# Patient Record
Sex: FEMALE | Race: WHITE | NOT HISPANIC OR LATINO
[De-identification: names, ages, dates, MRNs, and addresses within clinical notes are randomized per-mention and may not be internally consistent; named-entity substitution may affect disease eponyms.]

---

## 2017-02-24 ENCOUNTER — RECORD ABSTRACTING (OUTPATIENT)
Age: 32
End: 2017-02-24

## 2017-02-28 ENCOUNTER — RECORD ABSTRACTING (OUTPATIENT)
Age: 32
End: 2017-02-28

## 2017-02-28 RX ORDER — TOPIRAMATE 50 MG/1
TABLET, COATED ORAL
Refills: 0 | Status: ACTIVE | COMMUNITY

## 2017-06-30 ENCOUNTER — OUTPATIENT (OUTPATIENT)
Dept: OUTPATIENT SERVICES | Facility: HOSPITAL | Age: 32
LOS: 1 days | Discharge: HOME | End: 2017-06-30

## 2017-06-30 DIAGNOSIS — R53.83 OTHER FATIGUE: ICD-10-CM

## 2017-06-30 DIAGNOSIS — N20.0 CALCULUS OF KIDNEY: ICD-10-CM

## 2017-06-30 DIAGNOSIS — R06.02 SHORTNESS OF BREATH: ICD-10-CM

## 2017-06-30 DIAGNOSIS — R53.81 OTHER MALAISE: ICD-10-CM

## 2017-06-30 DIAGNOSIS — E78.00 PURE HYPERCHOLESTEROLEMIA, UNSPECIFIED: ICD-10-CM

## 2017-09-08 ENCOUNTER — APPOINTMENT (OUTPATIENT)
Dept: HEMATOLOGY ONCOLOGY | Facility: CLINIC | Age: 32
End: 2017-09-08

## 2017-09-08 VITALS
TEMPERATURE: 98.1 F | SYSTOLIC BLOOD PRESSURE: 109 MMHG | DIASTOLIC BLOOD PRESSURE: 50 MMHG | RESPIRATION RATE: 14 BRPM | HEART RATE: 57 BPM | HEIGHT: 63 IN | BODY MASS INDEX: 23.92 KG/M2 | WEIGHT: 135 LBS

## 2017-09-08 DIAGNOSIS — G43.909 MIGRAINE, UNSPECIFIED, NOT INTRACTABLE, W/OUT STATUS MIGRAINOSUS: ICD-10-CM

## 2017-09-08 DIAGNOSIS — N20.0 CALCULUS OF KIDNEY: ICD-10-CM

## 2017-09-08 DIAGNOSIS — Z80.0 FAMILY HISTORY OF MALIGNANT NEOPLASM OF DIGESTIVE ORGANS: ICD-10-CM

## 2017-09-08 DIAGNOSIS — Z82.3 FAMILY HISTORY OF STROKE: ICD-10-CM

## 2017-09-08 DIAGNOSIS — Q64.10 EXSTROPHY OF URINARY BLADDER, UNSPECIFIED: ICD-10-CM

## 2017-09-11 LAB
BASOPHILS # BLD: 0.03 TH/MM3
BASOPHILS NFR BLD: 0.8 %
EOSINOPHIL # BLD: 0.07 TH/MM3
EOSINOPHIL NFR BLD: 1.8 %
ERYTHROCYTE [DISTWIDTH] IN BLOOD BY AUTOMATED COUNT: 18.8 %
FERRITIN SERPL-MCNC: 8 NG/ML
FOLATE SERPL-MCNC: 8.3 NG/ML
GRANULOCYTES # BLD: 1.9 TH/MM3
GRANULOCYTES NFR BLD: 50 %
HCT VFR BLD AUTO: 30.3 %
HGB BLD-MCNC: 9.3 G/DL
IMM GRANULOCYTES # BLD: 0 TH/MM3
IMM GRANULOCYTES NFR BLD: 0 %
LYMPHOCYTES # BLD: 1.53 TH/MM3
LYMPHOCYTES NFR BLD: 40.3 %
MCH RBC QN AUTO: 22.8 PG
MCHC RBC AUTO-ENTMCNC: 30.7 G/DL
MCV RBC AUTO: 74.3 FL
MONOCYTES # BLD: 0.27 TH/MM3
MONOCYTES NFR BLD: 7.1 %
PERCENT SATURATION (NORTH): 3.1 %
PLATELET # BLD: 214 TH/MM3
PMV BLD AUTO: 10.4 FL
RBC # BLD AUTO: 4.08 MIL/MM3
TIBC SERPL-MCNC: 459 UG/DL
VIT B12 SERPL-MCNC: 570 PG/ML
WBC # BLD: 3.8 TH/MM3

## 2017-09-12 ENCOUNTER — APPOINTMENT (OUTPATIENT)
Dept: INFUSION THERAPY | Facility: CLINIC | Age: 32
End: 2017-09-12

## 2017-09-13 LAB — METHYLMALONATE SERPL-SCNC: 124 NMOL/L

## 2017-09-19 ENCOUNTER — APPOINTMENT (OUTPATIENT)
Dept: INFUSION THERAPY | Facility: CLINIC | Age: 32
End: 2017-09-19

## 2017-09-19 VITALS
TEMPERATURE: 97.7 F | SYSTOLIC BLOOD PRESSURE: 100 MMHG | HEART RATE: 65 BPM | DIASTOLIC BLOOD PRESSURE: 69 MMHG | RESPIRATION RATE: 18 BRPM

## 2017-10-11 ENCOUNTER — APPOINTMENT (OUTPATIENT)
Dept: HEMATOLOGY ONCOLOGY | Facility: CLINIC | Age: 32
End: 2017-10-11

## 2017-10-13 ENCOUNTER — OUTPATIENT (OUTPATIENT)
Dept: OUTPATIENT SERVICES | Facility: HOSPITAL | Age: 32
LOS: 1 days | Discharge: HOME | End: 2017-10-13

## 2017-10-13 DIAGNOSIS — D50.9 IRON DEFICIENCY ANEMIA, UNSPECIFIED: ICD-10-CM

## 2017-10-18 ENCOUNTER — APPOINTMENT (OUTPATIENT)
Dept: HEMATOLOGY ONCOLOGY | Facility: CLINIC | Age: 32
End: 2017-10-18

## 2017-10-18 VITALS
BODY MASS INDEX: 23.92 KG/M2 | SYSTOLIC BLOOD PRESSURE: 102 MMHG | DIASTOLIC BLOOD PRESSURE: 55 MMHG | HEIGHT: 63 IN | TEMPERATURE: 98.4 F | WEIGHT: 135 LBS | HEART RATE: 56 BPM | RESPIRATION RATE: 16 BRPM

## 2017-10-20 ENCOUNTER — RESULT REVIEW (OUTPATIENT)
Age: 32
End: 2017-10-20

## 2018-01-10 ENCOUNTER — OUTPATIENT (OUTPATIENT)
Dept: OUTPATIENT SERVICES | Facility: HOSPITAL | Age: 33
LOS: 1 days | Discharge: HOME | End: 2018-01-10

## 2018-01-10 DIAGNOSIS — D50.9 IRON DEFICIENCY ANEMIA, UNSPECIFIED: ICD-10-CM

## 2018-01-17 ENCOUNTER — APPOINTMENT (OUTPATIENT)
Dept: HEMATOLOGY ONCOLOGY | Facility: CLINIC | Age: 33
End: 2018-01-17

## 2018-01-17 ENCOUNTER — APPOINTMENT (OUTPATIENT)
Dept: INFUSION THERAPY | Facility: CLINIC | Age: 33
End: 2018-01-17

## 2018-01-17 VITALS
HEIGHT: 63 IN | TEMPERATURE: 97.6 F | DIASTOLIC BLOOD PRESSURE: 55 MMHG | BODY MASS INDEX: 24.27 KG/M2 | WEIGHT: 137 LBS | SYSTOLIC BLOOD PRESSURE: 107 MMHG | RESPIRATION RATE: 16 BRPM | HEART RATE: 56 BPM

## 2018-01-19 ENCOUNTER — APPOINTMENT (OUTPATIENT)
Dept: HEMATOLOGY ONCOLOGY | Facility: CLINIC | Age: 33
End: 2018-01-19

## 2018-01-19 LAB
ALBUMIN SERPL-MCNC: 3.9 G/DL
ALBUMIN/GLOB SERPL: 1.86
ALP SERPL-CCNC: 43 IU/L
ALT SERPL-CCNC: 14 IU/L
ANION GAP SERPL CALC-SCNC: 7 MEQ/L
APTT PPP: 26 SEC
AST SERPL-CCNC: 15 IU/L
BASOPHILS # BLD: 0.01 TH/MM3
BASOPHILS NFR BLD: 0.3 %
BILIRUB SERPL-MCNC: 0.5 MG/DL
BUN SERPL-MCNC: 19 MG/DL
BUN/CREAT SERPL: 25 %
CALCIUM SERPL-MCNC: 8.9 MG/DL
CHLORIDE SERPL-SCNC: 113 MEQ/L
CO2 SERPL-SCNC: 21 MEQ/L
CREAT SERPL-MCNC: 0.76 MG/DL
EOSINOPHIL # BLD: 0.03 TH/MM3
EOSINOPHIL NFR BLD: 0.8 %
ERYTHROCYTE [DISTWIDTH] IN BLOOD BY AUTOMATED COUNT: 13.2 %
FOLATE SERPL-MCNC: 7.7 NG/ML
GFR SERPL CREATININE-BSD FRML MDRD: 87
GLUCOSE SERPL-MCNC: 72 MG/DL
GRANULOCYTES # BLD: 2.26 TH/MM3
GRANULOCYTES NFR BLD: 59 %
HCT VFR BLD AUTO: 35 %
HGB BLD-MCNC: 11.4 G/DL
IMM GRANULOCYTES # BLD: 0 TH/MM3
IMM GRANULOCYTES NFR BLD: 0 %
INR PPP: 1.1
LYMPHOCYTES # BLD: 1.28 TH/MM3
LYMPHOCYTES NFR BLD: 33.4 %
MCH RBC QN AUTO: 30.9 PG
MCHC RBC AUTO-ENTMCNC: 32.6 G/DL
MCV RBC AUTO: 94.9 FL
MONOCYTES # BLD: 0.25 TH/MM3
MONOCYTES NFR BLD: 6.5 %
PLATELET # BLD: 144 TH/MM3
PMV BLD AUTO: 11.9 FL
POTASSIUM SERPL-SCNC: 3.3 MMOL/L
PROT SERPL-MCNC: 6 G/DL
PROTHROMBIN TIME: 11.6 SEC
RBC # BLD AUTO: 3.69 MIL/MM3
SODIUM SERPL-SCNC: 141 MEQ/L
VIT B12 SERPL-MCNC: 668 PG/ML
WBC # BLD: 3.83 TH/MM3

## 2018-01-24 ENCOUNTER — APPOINTMENT (OUTPATIENT)
Dept: INFUSION THERAPY | Facility: CLINIC | Age: 33
End: 2018-01-24

## 2018-01-25 LAB — METHYLMALONATE SERPL-SCNC: 128 NMOL/L

## 2018-01-31 ENCOUNTER — APPOINTMENT (OUTPATIENT)
Dept: INFUSION THERAPY | Facility: CLINIC | Age: 33
End: 2018-01-31

## 2018-02-01 ENCOUNTER — EMERGENCY (EMERGENCY)
Facility: HOSPITAL | Age: 33
LOS: 0 days | Discharge: HOME | End: 2018-02-01

## 2018-02-01 DIAGNOSIS — Z91.040 LATEX ALLERGY STATUS: ICD-10-CM

## 2018-02-01 DIAGNOSIS — E86.0 DEHYDRATION: ICD-10-CM

## 2018-02-01 DIAGNOSIS — R10.32 LEFT LOWER QUADRANT PAIN: ICD-10-CM

## 2018-02-01 DIAGNOSIS — Z79.52 LONG TERM (CURRENT) USE OF SYSTEMIC STEROIDS: ICD-10-CM

## 2018-02-02 ENCOUNTER — APPOINTMENT (OUTPATIENT)
Dept: HEMATOLOGY ONCOLOGY | Facility: CLINIC | Age: 33
End: 2018-02-02

## 2018-02-07 ENCOUNTER — APPOINTMENT (OUTPATIENT)
Dept: INFUSION THERAPY | Facility: CLINIC | Age: 33
End: 2018-02-07

## 2018-02-07 RX ORDER — IRON SUCROSE 20 MG/ML
200 INJECTION, SOLUTION INTRAVENOUS ONCE
Qty: 0 | Refills: 0 | Status: COMPLETED | OUTPATIENT
Start: 2018-02-07 | End: 2018-02-07

## 2018-02-07 RX ADMIN — IRON SUCROSE 110 MILLIGRAM(S): 20 INJECTION, SOLUTION INTRAVENOUS at 14:33

## 2018-02-13 ENCOUNTER — APPOINTMENT (OUTPATIENT)
Dept: INFUSION THERAPY | Facility: CLINIC | Age: 33
End: 2018-02-13

## 2018-02-13 ENCOUNTER — OUTPATIENT (OUTPATIENT)
Dept: OUTPATIENT SERVICES | Facility: HOSPITAL | Age: 33
LOS: 1 days | Discharge: HOME | End: 2018-02-13

## 2018-02-13 DIAGNOSIS — D50.9 IRON DEFICIENCY ANEMIA, UNSPECIFIED: ICD-10-CM

## 2018-02-13 RX ORDER — IRON SUCROSE 20 MG/ML
200 INJECTION, SOLUTION INTRAVENOUS ONCE
Qty: 0 | Refills: 0 | Status: COMPLETED | OUTPATIENT
Start: 2018-02-13 | End: 2018-02-13

## 2018-02-13 RX ADMIN — IRON SUCROSE 110 MILLIGRAM(S): 20 INJECTION, SOLUTION INTRAVENOUS at 15:25

## 2018-02-16 ENCOUNTER — RESULT REVIEW (OUTPATIENT)
Age: 33
End: 2018-02-16

## 2018-02-16 ENCOUNTER — APPOINTMENT (OUTPATIENT)
Dept: HEMATOLOGY ONCOLOGY | Facility: CLINIC | Age: 33
End: 2018-02-16

## 2018-02-16 ENCOUNTER — LABORATORY RESULT (OUTPATIENT)
Age: 33
End: 2018-02-16

## 2018-02-16 VITALS
HEART RATE: 58 BPM | SYSTOLIC BLOOD PRESSURE: 102 MMHG | TEMPERATURE: 97.2 F | RESPIRATION RATE: 16 BRPM | DIASTOLIC BLOOD PRESSURE: 50 MMHG | HEIGHT: 63 IN

## 2018-02-20 ENCOUNTER — LABORATORY RESULT (OUTPATIENT)
Age: 33
End: 2018-02-20

## 2018-02-21 ENCOUNTER — APPOINTMENT (OUTPATIENT)
Dept: HEMATOLOGY ONCOLOGY | Facility: CLINIC | Age: 33
End: 2018-02-21

## 2018-02-22 LAB
HCT VFR BLD CALC: 38.6 %
HGB BLD-MCNC: 12.7 G/DL
MCHC RBC-ENTMCNC: 32 PG
MCHC RBC-ENTMCNC: 32.9 G/DL
MCV RBC AUTO: 97.2 FL
PLATELET # BLD AUTO: 107 K/UL
PMV BLD: 12 FL
RBC # BLD: 3.97 M/UL
RBC # FLD: 14.4 %
WBC # FLD AUTO: 3.07 K/UL

## 2018-02-23 LAB — HEMATOPATHOLOGY REPORT: SIGNIFICANT CHANGE UP

## 2018-02-26 ENCOUNTER — OTHER (OUTPATIENT)
Age: 33
End: 2018-02-26

## 2018-02-28 ENCOUNTER — LABORATORY RESULT (OUTPATIENT)
Age: 33
End: 2018-02-28

## 2018-02-28 ENCOUNTER — OUTPATIENT (OUTPATIENT)
Dept: OUTPATIENT SERVICES | Facility: HOSPITAL | Age: 33
LOS: 1 days | Discharge: HOME | End: 2018-02-28

## 2018-02-28 DIAGNOSIS — D64.9 ANEMIA, UNSPECIFIED: ICD-10-CM

## 2018-03-14 ENCOUNTER — APPOINTMENT (OUTPATIENT)
Dept: HEMATOLOGY ONCOLOGY | Facility: CLINIC | Age: 33
End: 2018-03-14

## 2018-03-14 ENCOUNTER — LABORATORY RESULT (OUTPATIENT)
Age: 33
End: 2018-03-14

## 2018-03-14 VITALS
SYSTOLIC BLOOD PRESSURE: 102 MMHG | BODY MASS INDEX: 24.8 KG/M2 | TEMPERATURE: 96.7 F | DIASTOLIC BLOOD PRESSURE: 50 MMHG | HEART RATE: 58 BPM | RESPIRATION RATE: 16 BRPM | WEIGHT: 140 LBS | HEIGHT: 63 IN

## 2018-03-16 ENCOUNTER — OUTPATIENT (OUTPATIENT)
Dept: OUTPATIENT SERVICES | Facility: HOSPITAL | Age: 33
LOS: 1 days | Discharge: HOME | End: 2018-03-16

## 2018-03-16 ENCOUNTER — RESULT REVIEW (OUTPATIENT)
Age: 33
End: 2018-03-16

## 2018-03-16 VITALS
HEIGHT: 63 IN | DIASTOLIC BLOOD PRESSURE: 66 MMHG | RESPIRATION RATE: 20 BRPM | HEART RATE: 64 BPM | WEIGHT: 132.06 LBS | TEMPERATURE: 98 F | SYSTOLIC BLOOD PRESSURE: 109 MMHG

## 2018-03-16 VITALS — RESPIRATION RATE: 20 BRPM | HEART RATE: 60 BPM | DIASTOLIC BLOOD PRESSURE: 50 MMHG | SYSTOLIC BLOOD PRESSURE: 101 MMHG

## 2018-03-16 DIAGNOSIS — Q64.10 EXSTROPHY OF URINARY BLADDER, UNSPECIFIED: Chronic | ICD-10-CM

## 2018-03-16 NOTE — H&P ADULT - NSHPPHYSICALEXAM_GEN_ALL_CORE
PHYSICAL EXAM:   Vital Signs:  Vital Signs Last 24 Hrs  T(C): 36.6 (16 Mar 2018 10:28), Max: 36.6 (16 Mar 2018 10:03)  T(F): 97.9 (16 Mar 2018 10:03), Max: 97.9 (16 Mar 2018 10:03)  HR: 64 (16 Mar 2018 10:28) (64 - 64)  BP: 109/66 (16 Mar 2018 10:28) (109/66 - 109/66)  BP(mean): --  RR: 20 (16 Mar 2018 10:28) (20 - 20)  SpO2: --  Daily Height in cm: 160.02 (16 Mar 2018 10:28)    Daily     GENERAL:  Appears stated age, well-groomed, well-nourished, no distress  HEENT:  NC/AT,  conjunctivae clear and pink, no thyromegaly, nodules, adenopathy, no JVD, sclera -anicteric  CHEST:  Full & symmetric excursion, no increased effort, breath sounds clear  HEART:  Regular rhythm, S1, S2, no murmur/rub/S3/S4, no abdominal bruit, no edema  ABDOMEN:  Soft, non-tender, non-distended, normoactive bowel sounds,  no masses ,no hepato-splenomegaly, no signs of chronic liver disease  EXTEREMITIES:  no cyanosis,clubbing or edema  SKIN:  No rash/erythema/ecchymoses/petechiae/wounds/abscess/warm/dry  NEURO:  Alert, oriented, no asterixis, no tremor, no encephalopathy

## 2018-03-19 LAB
ALBUMIN SERPL ELPH-MCNC: 4.2 G/DL
ALP BLD-CCNC: 49 U/L
ALT SERPL-CCNC: 12 U/L
ANION GAP SERPL CALC-SCNC: 14 MMOL/L
AST SERPL-CCNC: 14 U/L
BILIRUB SERPL-MCNC: 0.2 MG/DL
BUN SERPL-MCNC: 20 MG/DL
CALCIUM SERPL-MCNC: 9 MG/DL
CHLORIDE SERPL-SCNC: 106 MMOL/L
CO2 SERPL-SCNC: 23 MMOL/L
CREAT SERPL-MCNC: 0.8 MG/DL
FERRITIN SERPL-MCNC: 132 NG/ML
GLUCOSE SERPL-MCNC: 84 MG/DL
HCT VFR BLD CALC: 40.1 %
HGB BLD-MCNC: 13 G/DL
IRON SATN MFR SERPL: 42 %
IRON SERPL-MCNC: 111 UG/DL
MCHC RBC-ENTMCNC: 31.7 PG
MCHC RBC-ENTMCNC: 32.4 G/DL
MCV RBC AUTO: 97.8 FL
PLATELET # BLD AUTO: 154 K/UL
PMV BLD: 11.1 FL
POTASSIUM SERPL-SCNC: 3.8 MMOL/L
PROT SERPL-MCNC: 6.3 G/DL
RBC # BLD: 4.1 M/UL
RBC # FLD: 13.9 %
SODIUM SERPL-SCNC: 143 MMOL/L
TIBC SERPL-MCNC: 263 UG/DL
TSH SERPL-ACNC: 1.7 UIU/ML
UIBC SERPL-MCNC: 152 UG/DL
WBC # FLD AUTO: 4.19 K/UL

## 2018-03-20 LAB
SURGICAL PATHOLOGY STUDY: SIGNIFICANT CHANGE UP
SURGICAL PATHOLOGY STUDY: SIGNIFICANT CHANGE UP

## 2018-03-22 DIAGNOSIS — K29.50 UNSPECIFIED CHRONIC GASTRITIS WITHOUT BLEEDING: ICD-10-CM

## 2018-03-22 DIAGNOSIS — K20.9 ESOPHAGITIS, UNSPECIFIED: ICD-10-CM

## 2018-03-22 DIAGNOSIS — D50.9 IRON DEFICIENCY ANEMIA, UNSPECIFIED: ICD-10-CM

## 2018-03-22 DIAGNOSIS — K29.80 DUODENITIS WITHOUT BLEEDING: ICD-10-CM

## 2018-05-09 ENCOUNTER — OUTPATIENT (OUTPATIENT)
Dept: OUTPATIENT SERVICES | Facility: HOSPITAL | Age: 33
LOS: 1 days | Discharge: HOME | End: 2018-05-09

## 2018-05-09 ENCOUNTER — LABORATORY RESULT (OUTPATIENT)
Age: 33
End: 2018-05-09

## 2018-05-09 DIAGNOSIS — Z00.00 ENCOUNTER FOR GENERAL ADULT MEDICAL EXAMINATION WITHOUT ABNORMAL FINDINGS: ICD-10-CM

## 2018-05-09 DIAGNOSIS — Q64.10 EXSTROPHY OF URINARY BLADDER, UNSPECIFIED: Chronic | ICD-10-CM

## 2018-05-16 ENCOUNTER — APPOINTMENT (OUTPATIENT)
Dept: HEMATOLOGY ONCOLOGY | Facility: CLINIC | Age: 33
End: 2018-05-16

## 2018-05-16 ENCOUNTER — OUTPATIENT (OUTPATIENT)
Dept: OUTPATIENT SERVICES | Facility: HOSPITAL | Age: 33
LOS: 1 days | Discharge: HOME | End: 2018-05-16

## 2018-05-16 ENCOUNTER — LABORATORY RESULT (OUTPATIENT)
Age: 33
End: 2018-05-16

## 2018-05-16 VITALS
HEART RATE: 76 BPM | BODY MASS INDEX: 24.63 KG/M2 | SYSTOLIC BLOOD PRESSURE: 109 MMHG | RESPIRATION RATE: 16 BRPM | WEIGHT: 139 LBS | HEIGHT: 63 IN | DIASTOLIC BLOOD PRESSURE: 80 MMHG | TEMPERATURE: 99.2 F

## 2018-05-16 DIAGNOSIS — Q64.10 EXSTROPHY OF URINARY BLADDER, UNSPECIFIED: Chronic | ICD-10-CM

## 2018-05-16 DIAGNOSIS — D50.9 IRON DEFICIENCY ANEMIA, UNSPECIFIED: ICD-10-CM

## 2018-05-16 DIAGNOSIS — D72.819 DECREASED WHITE BLOOD CELL COUNT, UNSPECIFIED: ICD-10-CM

## 2018-05-16 LAB
HCT VFR BLD CALC: 42.1 %
HGB BLD-MCNC: 13.8 G/DL
MCHC RBC-ENTMCNC: 31.6 PG
MCHC RBC-ENTMCNC: 32.8 G/DL
MCV RBC AUTO: 96.3 FL
PLATELET # BLD AUTO: 170 K/UL
PMV BLD: 10.9 FL
RBC # BLD: 4.37 M/UL
RBC # FLD: 12.7 %
WBC # FLD AUTO: 4.17 K/UL

## 2018-05-17 LAB
FERRITIN SERPL-MCNC: 62 NG/ML
FOLATE SERPL-MCNC: 10.7 NG/ML
IRON SATN MFR SERPL: 41 %
IRON SERPL-MCNC: 131 UG/DL
TIBC SERPL-MCNC: 320 UG/DL
UIBC SERPL-MCNC: 189 UG/DL
VIT B12 SERPL-MCNC: 582 PG/ML

## 2018-05-23 LAB — METHYLMALONATE SERPL-SCNC: 159 NMOL/L

## 2018-05-30 ENCOUNTER — LABORATORY RESULT (OUTPATIENT)
Age: 33
End: 2018-05-30

## 2018-05-30 ENCOUNTER — APPOINTMENT (OUTPATIENT)
Dept: HEMATOLOGY ONCOLOGY | Facility: CLINIC | Age: 33
End: 2018-05-30

## 2018-06-08 LAB
APPEARANCE: ABNORMAL
BILIRUBIN URINE: NEGATIVE
BLOOD URINE: ABNORMAL
COLOR: YELLOW
GLUCOSE QUALITATIVE U: NEGATIVE MG/DL
KETONES URINE: NEGATIVE
LEUKOCYTE ESTERASE URINE: ABNORMAL
NITRITE URINE: NEGATIVE
PH URINE: 7.5
PROTEIN URINE: ABNORMAL MG/DL
SPECIFIC GRAVITY URINE: 1.01
UROBILINOGEN URINE: 0.2 MG/DL (ref 0.2–?)

## 2018-06-12 ENCOUNTER — APPOINTMENT (OUTPATIENT)
Dept: HEMATOLOGY ONCOLOGY | Facility: CLINIC | Age: 33
End: 2018-06-12

## 2018-06-12 ENCOUNTER — OUTPATIENT (OUTPATIENT)
Dept: OUTPATIENT SERVICES | Facility: HOSPITAL | Age: 33
LOS: 1 days | Discharge: HOME | End: 2018-06-12

## 2018-06-12 ENCOUNTER — LABORATORY RESULT (OUTPATIENT)
Age: 33
End: 2018-06-12

## 2018-06-12 DIAGNOSIS — D50.9 IRON DEFICIENCY ANEMIA, UNSPECIFIED: ICD-10-CM

## 2018-06-12 DIAGNOSIS — Q64.10 EXSTROPHY OF URINARY BLADDER, UNSPECIFIED: Chronic | ICD-10-CM

## 2018-06-25 LAB
APPEARANCE: ABNORMAL
BACTERIA UR CULT: ABNORMAL
BILIRUBIN URINE: NEGATIVE
BLOOD URINE: ABNORMAL
COLOR: YELLOW
GLUCOSE QUALITATIVE U: NEGATIVE MG/DL
KETONES URINE: NEGATIVE
LEUKOCYTE ESTERASE URINE: ABNORMAL
NITRITE URINE: POSITIVE
PH URINE: 8
PROTEIN URINE: ABNORMAL MG/DL
SPECIFIC GRAVITY URINE: 1.01
UROBILINOGEN URINE: 0.2 MG/DL (ref 0.2–?)

## 2018-07-17 ENCOUNTER — APPOINTMENT (OUTPATIENT)
Dept: HEMATOLOGY ONCOLOGY | Facility: CLINIC | Age: 33
End: 2018-07-17

## 2018-07-23 PROBLEM — Z80.0 FAMILY HISTORY OF COLON CANCER: Status: ACTIVE | Noted: 2017-09-08

## 2018-08-16 PROBLEM — D64.9 ANEMIA, UNSPECIFIED: Chronic | Status: ACTIVE | Noted: 2018-03-16

## 2018-08-16 PROBLEM — Q64.10 EXSTROPHY OF URINARY BLADDER, UNSPECIFIED: Chronic | Status: ACTIVE | Noted: 2018-03-16

## 2018-08-16 PROBLEM — G43.909 MIGRAINE, UNSPECIFIED, NOT INTRACTABLE, WITHOUT STATUS MIGRAINOSUS: Chronic | Status: ACTIVE | Noted: 2018-03-16

## 2018-08-28 ENCOUNTER — OUTPATIENT (OUTPATIENT)
Dept: OUTPATIENT SERVICES | Facility: HOSPITAL | Age: 33
LOS: 1 days | Discharge: HOME | End: 2018-08-28

## 2018-08-28 VITALS
HEART RATE: 47 BPM | SYSTOLIC BLOOD PRESSURE: 94 MMHG | TEMPERATURE: 98 F | HEIGHT: 62 IN | WEIGHT: 128.09 LBS | RESPIRATION RATE: 18 BRPM | DIASTOLIC BLOOD PRESSURE: 57 MMHG

## 2018-08-28 DIAGNOSIS — D64.9 ANEMIA, UNSPECIFIED: ICD-10-CM

## 2018-08-28 DIAGNOSIS — Q64.10 EXSTROPHY OF URINARY BLADDER, UNSPECIFIED: Chronic | ICD-10-CM

## 2018-08-28 DIAGNOSIS — Z91.040 LATEX ALLERGY STATUS: ICD-10-CM

## 2018-08-28 DIAGNOSIS — K29.50 UNSPECIFIED CHRONIC GASTRITIS WITHOUT BLEEDING: ICD-10-CM

## 2018-08-28 DIAGNOSIS — K63.3 ULCER OF INTESTINE: ICD-10-CM

## 2018-08-28 DIAGNOSIS — K57.10 DIVERTICULOSIS OF SMALL INTESTINE WITHOUT PERFORATION OR ABSCESS WITHOUT BLEEDING: ICD-10-CM

## 2018-08-28 DIAGNOSIS — G43.909 MIGRAINE, UNSPECIFIED, NOT INTRACTABLE, WITHOUT STATUS MIGRAINOSUS: ICD-10-CM

## 2018-08-28 DIAGNOSIS — K50.00 CROHN'S DISEASE OF SMALL INTESTINE WITHOUT COMPLICATIONS: ICD-10-CM

## 2018-08-28 RX ORDER — TOPIRAMATE 25 MG
1 TABLET ORAL
Qty: 0 | Refills: 0 | COMMUNITY

## 2018-08-28 NOTE — H&P PST ADULT - ASSESSMENT
31 yo F with PMH mentioned here for VCE for work up of anemia.  Risks and benefits discussed.  Will proceed with the scheduled case.

## 2018-08-28 NOTE — ASU DISCHARGE PLAN (ADULT/PEDIATRIC). - NOTIFY
Persistent Nausea and Vomiting/Bleeding that does not stop/Inability to Tolerate Liquids or Foods/Pain not relieved by Medications/Fever greater than 101

## 2019-01-01 ENCOUNTER — LABORATORY RESULT (OUTPATIENT)
Age: 34
End: 2019-01-01

## 2019-01-01 ENCOUNTER — INPATIENT (INPATIENT)
Facility: HOSPITAL | Age: 34
LOS: 1 days | End: 2019-12-02
Attending: INTERNAL MEDICINE | Admitting: INTERNAL MEDICINE
Payer: COMMERCIAL

## 2019-01-01 ENCOUNTER — TRANSCRIPTION ENCOUNTER (OUTPATIENT)
Age: 34
End: 2019-01-01

## 2019-01-01 ENCOUNTER — APPOINTMENT (OUTPATIENT)
Dept: OBGYN | Facility: CLINIC | Age: 34
End: 2019-01-01
Payer: COMMERCIAL

## 2019-01-01 ENCOUNTER — RESULT REVIEW (OUTPATIENT)
Age: 34
End: 2019-01-01

## 2019-01-01 ENCOUNTER — OUTPATIENT (OUTPATIENT)
Dept: OUTPATIENT SERVICES | Facility: HOSPITAL | Age: 34
LOS: 1 days | Discharge: HOME | End: 2019-01-01

## 2019-01-01 ENCOUNTER — APPOINTMENT (OUTPATIENT)
Dept: INFUSION THERAPY | Facility: CLINIC | Age: 34
End: 2019-01-01

## 2019-01-01 ENCOUNTER — RX RENEWAL (OUTPATIENT)
Age: 34
End: 2019-01-01

## 2019-01-01 ENCOUNTER — CHART COPY (OUTPATIENT)
Age: 34
End: 2019-01-01

## 2019-01-01 ENCOUNTER — APPOINTMENT (OUTPATIENT)
Dept: HEMATOLOGY ONCOLOGY | Facility: CLINIC | Age: 34
End: 2019-01-01

## 2019-01-01 ENCOUNTER — APPOINTMENT (OUTPATIENT)
Dept: HEMATOLOGY ONCOLOGY | Facility: CLINIC | Age: 34
End: 2019-01-01
Payer: COMMERCIAL

## 2019-01-01 ENCOUNTER — APPOINTMENT (OUTPATIENT)
Age: 34
End: 2019-01-01

## 2019-01-01 ENCOUNTER — APPOINTMENT (OUTPATIENT)
Dept: ANTEPARTUM | Facility: CLINIC | Age: 34
End: 2019-01-01

## 2019-01-01 VITALS
RESPIRATION RATE: 18 BRPM | DIASTOLIC BLOOD PRESSURE: 68 MMHG | TEMPERATURE: 99 F | HEART RATE: 78 BPM | SYSTOLIC BLOOD PRESSURE: 105 MMHG | OXYGEN SATURATION: 98 %

## 2019-01-01 VITALS
HEIGHT: 63 IN | RESPIRATION RATE: 16 BRPM | TEMPERATURE: 98.3 F | DIASTOLIC BLOOD PRESSURE: 46 MMHG | SYSTOLIC BLOOD PRESSURE: 97 MMHG | BODY MASS INDEX: 20.2 KG/M2 | WEIGHT: 114 LBS | HEART RATE: 59 BPM

## 2019-01-01 VITALS
DIASTOLIC BLOOD PRESSURE: 60 MMHG | HEART RATE: 80 BPM | BODY MASS INDEX: 20.73 KG/M2 | SYSTOLIC BLOOD PRESSURE: 110 MMHG | WEIGHT: 117 LBS | HEIGHT: 63 IN

## 2019-01-01 VITALS
HEIGHT: 63 IN | BODY MASS INDEX: 20.38 KG/M2 | SYSTOLIC BLOOD PRESSURE: 108 MMHG | DIASTOLIC BLOOD PRESSURE: 49 MMHG | RESPIRATION RATE: 14 BRPM | WEIGHT: 115 LBS | HEART RATE: 61 BPM | TEMPERATURE: 97.5 F

## 2019-01-01 VITALS
BODY MASS INDEX: 20.73 KG/M2 | WEIGHT: 117 LBS | RESPIRATION RATE: 16 BRPM | TEMPERATURE: 98.6 F | DIASTOLIC BLOOD PRESSURE: 51 MMHG | HEART RATE: 86 BPM | HEIGHT: 63 IN | SYSTOLIC BLOOD PRESSURE: 112 MMHG

## 2019-01-01 VITALS
HEIGHT: 63 IN | WEIGHT: 117 LBS | HEART RATE: 58 BPM | TEMPERATURE: 98.2 F | DIASTOLIC BLOOD PRESSURE: 47 MMHG | RESPIRATION RATE: 16 BRPM | BODY MASS INDEX: 20.73 KG/M2 | SYSTOLIC BLOOD PRESSURE: 101 MMHG

## 2019-01-01 VITALS — OXYGEN SATURATION: 88 %

## 2019-01-01 DIAGNOSIS — Z87.440 PERSONAL HISTORY OF URINARY (TRACT) INFECTIONS: ICD-10-CM

## 2019-01-01 DIAGNOSIS — Q64.10 EXSTROPHY OF URINARY BLADDER, UNSPECIFIED: Chronic | ICD-10-CM

## 2019-01-01 DIAGNOSIS — Z01.419 ENCOUNTER FOR GYNECOLOGICAL EXAMINATION (GENERAL) (ROUTINE) WITHOUT ABNORMAL FINDINGS: ICD-10-CM

## 2019-01-01 DIAGNOSIS — N81.4 UTEROVAGINAL PROLAPSE, UNSPECIFIED: ICD-10-CM

## 2019-01-01 DIAGNOSIS — D50.9 IRON DEFICIENCY ANEMIA, UNSPECIFIED: ICD-10-CM

## 2019-01-01 DIAGNOSIS — Z12.4 ENCOUNTER FOR SCREENING FOR MALIGNANT NEOPLASM OF CERVIX: ICD-10-CM

## 2019-01-01 DIAGNOSIS — N92.6 IRREGULAR MENSTRUATION, UNSPECIFIED: ICD-10-CM

## 2019-01-01 DIAGNOSIS — N81.2 INCOMPLETE UTEROVAGINAL PROLAPSE: ICD-10-CM

## 2019-01-01 DIAGNOSIS — K63.2 FISTULA OF INTESTINE: ICD-10-CM

## 2019-01-01 DIAGNOSIS — N32.2 VESICAL FISTULA, NOT ELSEWHERE CLASSIFIED: ICD-10-CM

## 2019-01-01 DIAGNOSIS — Z87.42 PERSONAL HISTORY OF OTHER DISEASES OF THE FEMALE GENITAL TRACT: ICD-10-CM

## 2019-01-01 DIAGNOSIS — Z00.00 ENCOUNTER FOR GENERAL ADULT MEDICAL EXAMINATION W/OUT ABNORMAL FINDINGS: ICD-10-CM

## 2019-01-01 DIAGNOSIS — D51.9 VITAMIN B12 DEFICIENCY ANEMIA, UNSPECIFIED: ICD-10-CM

## 2019-01-01 LAB
25(OH)D3 SERPL-MCNC: 19 NG/ML
ABO RH CONFIRMATION: SIGNIFICANT CHANGE UP
ALBUMIN SERPL ELPH-MCNC: 2.4 G/DL — LOW (ref 3.5–5.2)
ALBUMIN SERPL ELPH-MCNC: 2.5 G/DL — LOW (ref 3.5–5.2)
ALBUMIN SERPL ELPH-MCNC: 2.5 G/DL — LOW (ref 3.5–5.2)
ALBUMIN SERPL ELPH-MCNC: 3.7 G/DL — SIGNIFICANT CHANGE UP (ref 3.5–5.2)
ALBUMIN SERPL ELPH-MCNC: 3.7 G/DL — SIGNIFICANT CHANGE UP (ref 3.5–5.2)
ALBUMIN SERPL ELPH-MCNC: 4.3 G/DL — SIGNIFICANT CHANGE UP (ref 3.5–5.2)
ALP SERPL-CCNC: 34 U/L — SIGNIFICANT CHANGE UP (ref 30–115)
ALP SERPL-CCNC: 34 U/L — SIGNIFICANT CHANGE UP (ref 30–115)
ALP SERPL-CCNC: 36 U/L — SIGNIFICANT CHANGE UP (ref 30–115)
ALP SERPL-CCNC: 37 U/L — SIGNIFICANT CHANGE UP (ref 30–115)
ALP SERPL-CCNC: 40 U/L — SIGNIFICANT CHANGE UP (ref 30–115)
ALP SERPL-CCNC: 40 U/L — SIGNIFICANT CHANGE UP (ref 30–115)
ALT FLD-CCNC: 1021 U/L — HIGH (ref 0–41)
ALT FLD-CCNC: 11 U/L — SIGNIFICANT CHANGE UP (ref 0–41)
ALT FLD-CCNC: 12 U/L — SIGNIFICANT CHANGE UP (ref 0–41)
ALT FLD-CCNC: 12 U/L — SIGNIFICANT CHANGE UP (ref 0–41)
ALT FLD-CCNC: 1318 U/L — HIGH (ref 0–41)
ALT FLD-CCNC: 332 U/L — HIGH (ref 0–41)
AMPHET UR-MCNC: NEGATIVE — SIGNIFICANT CHANGE UP
ANION GAP SERPL CALC-SCNC: 14 MMOL/L — SIGNIFICANT CHANGE UP (ref 7–14)
ANION GAP SERPL CALC-SCNC: 15 MMOL/L — HIGH (ref 7–14)
ANION GAP SERPL CALC-SCNC: 16 MMOL/L — HIGH (ref 7–14)
ANION GAP SERPL CALC-SCNC: 16 MMOL/L — HIGH (ref 7–14)
ANION GAP SERPL CALC-SCNC: 18 MMOL/L — HIGH (ref 7–14)
ANION GAP SERPL CALC-SCNC: 18 MMOL/L — HIGH (ref 7–14)
ANION GAP SERPL CALC-SCNC: 19 MMOL/L — HIGH (ref 7–14)
APPEARANCE UR: CLEAR — SIGNIFICANT CHANGE UP
APTT BLD: 22.7 SEC — CRITICAL LOW (ref 27–39.2)
APTT BLD: 24.7 SEC — LOW (ref 27–39.2)
APTT BLD: 25.6 SEC — LOW (ref 27–39.2)
AST SERPL-CCNC: 1103 U/L — HIGH (ref 0–41)
AST SERPL-CCNC: 12 U/L — SIGNIFICANT CHANGE UP (ref 0–41)
AST SERPL-CCNC: 12 U/L — SIGNIFICANT CHANGE UP (ref 0–41)
AST SERPL-CCNC: 1468 U/L — HIGH (ref 0–41)
AST SERPL-CCNC: 17 U/L — SIGNIFICANT CHANGE UP (ref 0–41)
AST SERPL-CCNC: 330 U/L — HIGH (ref 0–41)
BARBITURATES UR SCN-MCNC: NEGATIVE — SIGNIFICANT CHANGE UP
BASE EXCESS BLDA CALC-SCNC: -18.6 MMOL/L — LOW (ref -2–2)
BASE EXCESS BLDA CALC-SCNC: -4 MMOL/L — LOW (ref -2–2)
BASE EXCESS BLDA CALC-SCNC: 0.6 MMOL/L — SIGNIFICANT CHANGE UP (ref -2–2)
BASE EXCESS BLDA CALC-SCNC: 7.7 MMOL/L — HIGH (ref -2–2)
BASOPHILS # BLD AUTO: 0.01 K/UL — SIGNIFICANT CHANGE UP (ref 0–0.2)
BASOPHILS # BLD AUTO: 0.02 K/UL — SIGNIFICANT CHANGE UP (ref 0–0.2)
BASOPHILS # BLD AUTO: 0.03 K/UL — SIGNIFICANT CHANGE UP (ref 0–0.2)
BASOPHILS # BLD AUTO: 0.04 K/UL — SIGNIFICANT CHANGE UP (ref 0–0.2)
BASOPHILS NFR BLD AUTO: 0.1 % — SIGNIFICANT CHANGE UP (ref 0–1)
BASOPHILS NFR BLD AUTO: 0.2 % — SIGNIFICANT CHANGE UP (ref 0–1)
BASOPHILS NFR BLD AUTO: 0.3 % — SIGNIFICANT CHANGE UP (ref 0–1)
BASOPHILS NFR BLD AUTO: 0.7 % — SIGNIFICANT CHANGE UP (ref 0–1)
BENZODIAZ UR-MCNC: NEGATIVE — SIGNIFICANT CHANGE UP
BILIRUB SERPL-MCNC: 0.3 MG/DL — SIGNIFICANT CHANGE UP (ref 0.2–1.2)
BILIRUB SERPL-MCNC: 0.3 MG/DL — SIGNIFICANT CHANGE UP (ref 0.2–1.2)
BILIRUB SERPL-MCNC: 0.4 MG/DL — SIGNIFICANT CHANGE UP (ref 0.2–1.2)
BILIRUB SERPL-MCNC: 0.4 MG/DL — SIGNIFICANT CHANGE UP (ref 0.2–1.2)
BILIRUB SERPL-MCNC: 0.6 MG/DL — SIGNIFICANT CHANGE UP (ref 0.2–1.2)
BILIRUB SERPL-MCNC: 0.7 MG/DL — SIGNIFICANT CHANGE UP (ref 0.2–1.2)
BILIRUB UR-MCNC: NEGATIVE — SIGNIFICANT CHANGE UP
BLD GP AB SCN SERPL QL: SIGNIFICANT CHANGE UP
BUN SERPL-MCNC: 12 MG/DL — SIGNIFICANT CHANGE UP (ref 10–20)
BUN SERPL-MCNC: 12 MG/DL — SIGNIFICANT CHANGE UP (ref 10–20)
BUN SERPL-MCNC: 16 MG/DL — SIGNIFICANT CHANGE UP (ref 10–20)
BUN SERPL-MCNC: 17 MG/DL — SIGNIFICANT CHANGE UP (ref 10–20)
BUN SERPL-MCNC: 18 MG/DL — SIGNIFICANT CHANGE UP (ref 10–20)
BUN SERPL-MCNC: 22 MG/DL — HIGH (ref 10–20)
BUN SERPL-MCNC: 27 MG/DL — HIGH (ref 10–20)
BUN SERPL-MCNC: 5 MG/DL — LOW (ref 10–20)
BUN SERPL-MCNC: 7 MG/DL — LOW (ref 10–20)
BUN SERPL-MCNC: 8 MG/DL — LOW (ref 10–20)
CALCIUM SERPL-MCNC: 7.1 MG/DL — LOW (ref 8.5–10.1)
CALCIUM SERPL-MCNC: 7.4 MG/DL — LOW (ref 8.5–10.1)
CALCIUM SERPL-MCNC: 7.5 MG/DL — LOW (ref 8.5–10.1)
CALCIUM SERPL-MCNC: 7.6 MG/DL — LOW (ref 8.5–10.1)
CALCIUM SERPL-MCNC: 7.7 MG/DL — LOW (ref 8.5–10.1)
CALCIUM SERPL-MCNC: 7.7 MG/DL — LOW (ref 8.5–10.1)
CALCIUM SERPL-MCNC: 7.8 MG/DL — LOW (ref 8.5–10.1)
CALCIUM SERPL-MCNC: 8.1 MG/DL — LOW (ref 8.5–10.1)
CALCIUM SERPL-MCNC: 8.4 MG/DL — LOW (ref 8.5–10.1)
CALCIUM SERPL-MCNC: 8.6 MG/DL — SIGNIFICANT CHANGE UP (ref 8.5–10.1)
CHLORIDE SERPL-SCNC: 109 MMOL/L — SIGNIFICANT CHANGE UP (ref 98–110)
CHLORIDE SERPL-SCNC: 110 MMOL/L — SIGNIFICANT CHANGE UP (ref 98–110)
CHLORIDE SERPL-SCNC: 112 MMOL/L — HIGH (ref 98–110)
CHLORIDE SERPL-SCNC: 113 MMOL/L — HIGH (ref 98–110)
CHLORIDE SERPL-SCNC: 118 MMOL/L — HIGH (ref 98–110)
CHLORIDE SERPL-SCNC: 125 MMOL/L — HIGH (ref 98–110)
CHLORIDE SERPL-SCNC: 125 MMOL/L — HIGH (ref 98–110)
CHLORIDE SERPL-SCNC: 127 MMOL/L — HIGH (ref 98–110)
CHLORIDE SERPL-SCNC: 129 MMOL/L — HIGH (ref 98–110)
CHLORIDE SERPL-SCNC: 130 MMOL/L — HIGH (ref 98–110)
CK MB CFR SERPL CALC: 40.5 NG/ML — HIGH (ref 0.6–6.3)
CK MB CFR SERPL CALC: 55.8 NG/ML — HIGH (ref 0.6–6.3)
CK MB CFR SERPL CALC: 61.2 NG/ML — HIGH (ref 0.6–6.3)
CK SERPL-CCNC: 1150 U/L — HIGH (ref 0–225)
CK SERPL-CCNC: 1239 U/L — HIGH (ref 0–225)
CK SERPL-CCNC: 742 U/L — HIGH (ref 0–225)
CO2 SERPL-SCNC: 10 MMOL/L — LOW (ref 17–32)
CO2 SERPL-SCNC: 11 MMOL/L — LOW (ref 17–32)
CO2 SERPL-SCNC: 13 MMOL/L — LOW (ref 17–32)
CO2 SERPL-SCNC: 14 MMOL/L — LOW (ref 17–32)
CO2 SERPL-SCNC: 18 MMOL/L — SIGNIFICANT CHANGE UP (ref 17–32)
CO2 SERPL-SCNC: 21 MMOL/L — SIGNIFICANT CHANGE UP (ref 17–32)
CO2 SERPL-SCNC: 21 MMOL/L — SIGNIFICANT CHANGE UP (ref 17–32)
CO2 SERPL-SCNC: 22 MMOL/L — SIGNIFICANT CHANGE UP (ref 17–32)
CO2 SERPL-SCNC: 24 MMOL/L — SIGNIFICANT CHANGE UP (ref 17–32)
CO2 SERPL-SCNC: 8 MMOL/L — CRITICAL LOW (ref 17–32)
COCAINE METAB.OTHER UR-MCNC: NEGATIVE — SIGNIFICANT CHANGE UP
COLOR SPEC: SIGNIFICANT CHANGE UP
CREAT ?TM UR-MCNC: 12 MG/DL — SIGNIFICANT CHANGE UP
CREAT SERPL-MCNC: 0.5 MG/DL — LOW (ref 0.7–1.5)
CREAT SERPL-MCNC: 0.5 MG/DL — LOW (ref 0.7–1.5)
CREAT SERPL-MCNC: 0.8 MG/DL — SIGNIFICANT CHANGE UP (ref 0.7–1.5)
CREAT SERPL-MCNC: 0.8 MG/DL — SIGNIFICANT CHANGE UP (ref 0.7–1.5)
CREAT SERPL-MCNC: 1.3 MG/DL — SIGNIFICANT CHANGE UP (ref 0.7–1.5)
CREAT SERPL-MCNC: 1.3 MG/DL — SIGNIFICANT CHANGE UP (ref 0.7–1.5)
CREAT SERPL-MCNC: 1.4 MG/DL — SIGNIFICANT CHANGE UP (ref 0.7–1.5)
CREAT SERPL-MCNC: 1.6 MG/DL — HIGH (ref 0.7–1.5)
CREAT SERPL-MCNC: 2 MG/DL — HIGH (ref 0.7–1.5)
CREAT SERPL-MCNC: 2.8 MG/DL — HIGH (ref 0.7–1.5)
DIFF PNL FLD: NEGATIVE — SIGNIFICANT CHANGE UP
EOSINOPHIL # BLD AUTO: 0 K/UL — SIGNIFICANT CHANGE UP (ref 0–0.7)
EOSINOPHIL # BLD AUTO: 0 K/UL — SIGNIFICANT CHANGE UP (ref 0–0.7)
EOSINOPHIL # BLD AUTO: 0.01 K/UL — SIGNIFICANT CHANGE UP (ref 0–0.7)
EOSINOPHIL # BLD AUTO: 0.02 K/UL — SIGNIFICANT CHANGE UP (ref 0–0.7)
EOSINOPHIL NFR BLD AUTO: 0 % — SIGNIFICANT CHANGE UP (ref 0–8)
EOSINOPHIL NFR BLD AUTO: 0 % — SIGNIFICANT CHANGE UP (ref 0–8)
EOSINOPHIL NFR BLD AUTO: 0.1 % — SIGNIFICANT CHANGE UP (ref 0–8)
EOSINOPHIL NFR BLD AUTO: 0.3 % — SIGNIFICANT CHANGE UP (ref 0–8)
ERYTHROCYTE [SEDIMENTATION RATE] IN BLOOD: 5 MM/HR — SIGNIFICANT CHANGE UP (ref 0–20)
ETHANOL SERPL-MCNC: <10 MG/DL — SIGNIFICANT CHANGE UP
FERRITIN SERPL-MCNC: 215 NG/ML
FERRITIN SERPL-MCNC: 290 NG/ML
FERRITIN SERPL-MCNC: 34 NG/ML
FOLATE SERPL-MCNC: 11 NG/ML
FOLATE SERPL-MCNC: 7.5 NG/ML
GAS PNL BLDA: SIGNIFICANT CHANGE UP
GLUCOSE BLDC GLUCOMTR-MCNC: 138 MG/DL — HIGH (ref 70–99)
GLUCOSE BLDC GLUCOMTR-MCNC: 144 MG/DL — HIGH (ref 70–99)
GLUCOSE SERPL-MCNC: 116 MG/DL — HIGH (ref 70–99)
GLUCOSE SERPL-MCNC: 136 MG/DL — HIGH (ref 70–99)
GLUCOSE SERPL-MCNC: 152 MG/DL — HIGH (ref 70–99)
GLUCOSE SERPL-MCNC: 173 MG/DL — HIGH (ref 70–99)
GLUCOSE SERPL-MCNC: 178 MG/DL — HIGH (ref 70–99)
GLUCOSE SERPL-MCNC: 191 MG/DL — HIGH (ref 70–99)
GLUCOSE SERPL-MCNC: 201 MG/DL — HIGH (ref 70–99)
GLUCOSE SERPL-MCNC: 202 MG/DL — HIGH (ref 70–99)
GLUCOSE SERPL-MCNC: 217 MG/DL — HIGH (ref 70–99)
GLUCOSE SERPL-MCNC: 240 MG/DL — HIGH (ref 70–99)
GLUCOSE UR QL: NEGATIVE — SIGNIFICANT CHANGE UP
HCG SERPL QL: NEGATIVE — SIGNIFICANT CHANGE UP
HCO3 BLDA-SCNC: 21 MMOL/L — LOW (ref 23–27)
HCO3 BLDA-SCNC: 26 MMOL/L — SIGNIFICANT CHANGE UP (ref 23–27)
HCO3 BLDA-SCNC: 32 MMOL/L — HIGH (ref 23–27)
HCO3 BLDA-SCNC: 9 MMOL/L — CRITICAL LOW (ref 23–27)
HCT VFR BLD CALC: 35.1 % — LOW (ref 37–47)
HCT VFR BLD CALC: 35.3 % — LOW (ref 37–47)
HCT VFR BLD CALC: 36.7 % — LOW (ref 37–47)
HCT VFR BLD CALC: 38 %
HCT VFR BLD CALC: 38.1 % — SIGNIFICANT CHANGE UP (ref 37–47)
HCT VFR BLD CALC: 40.7 % — SIGNIFICANT CHANGE UP (ref 37–47)
HCT VFR BLD CALC: 41 %
HGB BLD-MCNC: 11.4 G/DL — LOW (ref 12–16)
HGB BLD-MCNC: 11.8 G/DL — LOW (ref 12–16)
HGB BLD-MCNC: 12.2 G/DL — SIGNIFICANT CHANGE UP (ref 12–16)
HGB BLD-MCNC: 12.5 G/DL — SIGNIFICANT CHANGE UP (ref 12–16)
HGB BLD-MCNC: 12.8 G/DL
HGB BLD-MCNC: 13.2 G/DL — SIGNIFICANT CHANGE UP (ref 12–16)
HGB BLD-MCNC: 13.9 G/DL
HOROWITZ INDEX BLDA+IHG-RTO: 100 — SIGNIFICANT CHANGE UP
HOROWITZ INDEX BLDA+IHG-RTO: 100 — SIGNIFICANT CHANGE UP
IMM GRANULOCYTES NFR BLD AUTO: 0.3 % — SIGNIFICANT CHANGE UP (ref 0.1–0.3)
IMM GRANULOCYTES NFR BLD AUTO: 0.3 % — SIGNIFICANT CHANGE UP (ref 0.1–0.3)
IMM GRANULOCYTES NFR BLD AUTO: 0.4 % — HIGH (ref 0.1–0.3)
IMM GRANULOCYTES NFR BLD AUTO: 0.6 % — HIGH (ref 0.1–0.3)
INR BLD: 1.05 RATIO — SIGNIFICANT CHANGE UP (ref 0.65–1.3)
INR BLD: 1.08 RATIO — SIGNIFICANT CHANGE UP (ref 0.65–1.3)
INR BLD: 1.11 RATIO — SIGNIFICANT CHANGE UP (ref 0.65–1.3)
IRON SATN MFR SERPL: 11 %
IRON SATN MFR SERPL: 25 %
IRON SATN MFR SERPL: 40 %
IRON SERPL-MCNC: 34 UG/DL
IRON SERPL-MCNC: 57 UG/DL
IRON SERPL-MCNC: 91 UG/DL
KETONES UR-MCNC: NEGATIVE — SIGNIFICANT CHANGE UP
LACTATE SERPL-SCNC: 1 MMOL/L — SIGNIFICANT CHANGE UP (ref 0.7–2)
LEUKOCYTE ESTERASE UR-ACNC: NEGATIVE — SIGNIFICANT CHANGE UP
LYMPHOCYTES # BLD AUTO: 0.88 K/UL — LOW (ref 1.2–3.4)
LYMPHOCYTES # BLD AUTO: 0.92 K/UL — LOW (ref 1.2–3.4)
LYMPHOCYTES # BLD AUTO: 1.06 K/UL — LOW (ref 1.2–3.4)
LYMPHOCYTES # BLD AUTO: 1.12 K/UL — LOW (ref 1.2–3.4)
LYMPHOCYTES # BLD AUTO: 10.1 % — LOW (ref 20.5–51.1)
LYMPHOCYTES # BLD AUTO: 12.1 % — LOW (ref 20.5–51.1)
LYMPHOCYTES # BLD AUTO: 15.4 % — LOW (ref 20.5–51.1)
LYMPHOCYTES # BLD AUTO: 7.7 % — LOW (ref 20.5–51.1)
MAGNESIUM SERPL-MCNC: 1.5 MG/DL — LOW (ref 1.8–2.4)
MAGNESIUM SERPL-MCNC: 1.6 MG/DL — LOW (ref 1.8–2.4)
MAGNESIUM SERPL-MCNC: 1.9 MG/DL — SIGNIFICANT CHANGE UP (ref 1.8–2.4)
MCHC RBC-ENTMCNC: 32.3 G/DL — SIGNIFICANT CHANGE UP (ref 32–37)
MCHC RBC-ENTMCNC: 32.4 G/DL — SIGNIFICANT CHANGE UP (ref 32–37)
MCHC RBC-ENTMCNC: 32.5 PG
MCHC RBC-ENTMCNC: 32.8 G/DL — SIGNIFICANT CHANGE UP (ref 32–37)
MCHC RBC-ENTMCNC: 32.9 PG — HIGH (ref 27–31)
MCHC RBC-ENTMCNC: 33.2 G/DL — SIGNIFICANT CHANGE UP (ref 32–37)
MCHC RBC-ENTMCNC: 33.2 PG — HIGH (ref 27–31)
MCHC RBC-ENTMCNC: 33.2 PG — HIGH (ref 27–31)
MCHC RBC-ENTMCNC: 33.4 PG
MCHC RBC-ENTMCNC: 33.6 G/DL — SIGNIFICANT CHANGE UP (ref 32–37)
MCHC RBC-ENTMCNC: 33.7 G/DL
MCHC RBC-ENTMCNC: 33.7 PG — HIGH (ref 27–31)
MCHC RBC-ENTMCNC: 33.7 PG — HIGH (ref 27–31)
MCHC RBC-ENTMCNC: 33.9 G/DL
MCV RBC AUTO: 100.3 FL — HIGH (ref 81–99)
MCV RBC AUTO: 101.3 FL — HIGH (ref 81–99)
MCV RBC AUTO: 101.4 FL — HIGH (ref 81–99)
MCV RBC AUTO: 102 FL — HIGH (ref 81–99)
MCV RBC AUTO: 102.3 FL — HIGH (ref 81–99)
MCV RBC AUTO: 96.4 FL
MCV RBC AUTO: 98.6 FL
METHADONE UR-MCNC: NEGATIVE — SIGNIFICANT CHANGE UP
METHYLMALONATE SERPL-SCNC: 154 NMOL/L
MONOCYTES # BLD AUTO: 0.23 K/UL — SIGNIFICANT CHANGE UP (ref 0.1–0.6)
MONOCYTES # BLD AUTO: 0.24 K/UL — SIGNIFICANT CHANGE UP (ref 0.1–0.6)
MONOCYTES # BLD AUTO: 0.64 K/UL — HIGH (ref 0.1–0.6)
MONOCYTES # BLD AUTO: 0.76 K/UL — HIGH (ref 0.1–0.6)
MONOCYTES NFR BLD AUTO: 2.6 % — SIGNIFICANT CHANGE UP (ref 1.7–9.3)
MONOCYTES NFR BLD AUTO: 4.2 % — SIGNIFICANT CHANGE UP (ref 1.7–9.3)
MONOCYTES NFR BLD AUTO: 5.3 % — SIGNIFICANT CHANGE UP (ref 1.7–9.3)
MONOCYTES NFR BLD AUTO: 6.8 % — SIGNIFICANT CHANGE UP (ref 1.7–9.3)
NEUTROPHILS # BLD AUTO: 10.38 K/UL — HIGH (ref 1.4–6.5)
NEUTROPHILS # BLD AUTO: 4.52 K/UL — SIGNIFICANT CHANGE UP (ref 1.4–6.5)
NEUTROPHILS # BLD AUTO: 7.43 K/UL — HIGH (ref 1.4–6.5)
NEUTROPHILS # BLD AUTO: 9.17 K/UL — HIGH (ref 1.4–6.5)
NEUTROPHILS NFR BLD AUTO: 79.1 % — HIGH (ref 42.2–75.2)
NEUTROPHILS NFR BLD AUTO: 82.4 % — HIGH (ref 42.2–75.2)
NEUTROPHILS NFR BLD AUTO: 84.6 % — HIGH (ref 42.2–75.2)
NEUTROPHILS NFR BLD AUTO: 86.4 % — HIGH (ref 42.2–75.2)
NITRITE UR-MCNC: NEGATIVE — SIGNIFICANT CHANGE UP
NRBC # BLD: 0 /100 WBCS — SIGNIFICANT CHANGE UP (ref 0–0)
OPIATES UR-MCNC: NEGATIVE — SIGNIFICANT CHANGE UP
OSMOLALITY SERPL: 282 MOSMOL/KG — SIGNIFICANT CHANGE UP (ref 275–300)
PCO2 BLDA: 29 MMHG — LOW (ref 38–42)
PCO2 BLDA: 39 MMHG — SIGNIFICANT CHANGE UP (ref 38–42)
PCO2 BLDA: 45 MMHG — HIGH (ref 38–42)
PCO2 BLDA: 46 MMHG — HIGH (ref 38–42)
PCP SPEC-MCNC: SIGNIFICANT CHANGE UP
PH BLDA: 7.12 — CRITICAL LOW (ref 7.38–7.42)
PH BLDA: 7.35 — LOW (ref 7.38–7.42)
PH BLDA: 7.36 — LOW (ref 7.38–7.42)
PH BLDA: 7.47 — HIGH (ref 7.38–7.42)
PH UR: 7.5 — SIGNIFICANT CHANGE UP (ref 5–8)
PHOSPHATE SERPL-MCNC: 2.2 MG/DL — SIGNIFICANT CHANGE UP (ref 2.1–4.9)
PLATELET # BLD AUTO: 108 K/UL — LOW (ref 130–400)
PLATELET # BLD AUTO: 110 K/UL — LOW (ref 130–400)
PLATELET # BLD AUTO: 126 K/UL
PLATELET # BLD AUTO: 133 K/UL — SIGNIFICANT CHANGE UP (ref 130–400)
PLATELET # BLD AUTO: 149 K/UL
PLATELET # BLD AUTO: 166 K/UL — SIGNIFICANT CHANGE UP (ref 130–400)
PLATELET # BLD AUTO: 75 K/UL — LOW (ref 130–400)
PMV BLD: 11.1 FL
PMV BLD: 11.4 FL
PO2 BLDA: 209 MMHG — HIGH (ref 78–95)
PO2 BLDA: 68 MMHG — LOW (ref 78–95)
PO2 BLDA: 70 MMHG — LOW (ref 78–95)
PO2 BLDA: 71 MMHG — LOW (ref 78–95)
POTASSIUM SERPL-MCNC: 3 MMOL/L — LOW (ref 3.5–5)
POTASSIUM SERPL-MCNC: 3.3 MMOL/L — LOW (ref 3.5–5)
POTASSIUM SERPL-MCNC: 3.5 MMOL/L — SIGNIFICANT CHANGE UP (ref 3.5–5)
POTASSIUM SERPL-MCNC: 3.6 MMOL/L — SIGNIFICANT CHANGE UP (ref 3.5–5)
POTASSIUM SERPL-MCNC: 3.6 MMOL/L — SIGNIFICANT CHANGE UP (ref 3.5–5)
POTASSIUM SERPL-MCNC: 3.8 MMOL/L — SIGNIFICANT CHANGE UP (ref 3.5–5)
POTASSIUM SERPL-MCNC: 3.9 MMOL/L — SIGNIFICANT CHANGE UP (ref 3.5–5)
POTASSIUM SERPL-MCNC: 4.1 MMOL/L — SIGNIFICANT CHANGE UP (ref 3.5–5)
POTASSIUM SERPL-MCNC: 4.2 MMOL/L — SIGNIFICANT CHANGE UP (ref 3.5–5)
POTASSIUM SERPL-MCNC: 4.9 MMOL/L — SIGNIFICANT CHANGE UP (ref 3.5–5)
POTASSIUM SERPL-SCNC: 3 MMOL/L — LOW (ref 3.5–5)
POTASSIUM SERPL-SCNC: 3.3 MMOL/L — LOW (ref 3.5–5)
POTASSIUM SERPL-SCNC: 3.5 MMOL/L — SIGNIFICANT CHANGE UP (ref 3.5–5)
POTASSIUM SERPL-SCNC: 3.6 MMOL/L — SIGNIFICANT CHANGE UP (ref 3.5–5)
POTASSIUM SERPL-SCNC: 3.6 MMOL/L — SIGNIFICANT CHANGE UP (ref 3.5–5)
POTASSIUM SERPL-SCNC: 3.8 MMOL/L — SIGNIFICANT CHANGE UP (ref 3.5–5)
POTASSIUM SERPL-SCNC: 3.9 MMOL/L — SIGNIFICANT CHANGE UP (ref 3.5–5)
POTASSIUM SERPL-SCNC: 4.1 MMOL/L — SIGNIFICANT CHANGE UP (ref 3.5–5)
POTASSIUM SERPL-SCNC: 4.2 MMOL/L — SIGNIFICANT CHANGE UP (ref 3.5–5)
POTASSIUM SERPL-SCNC: 4.9 MMOL/L — SIGNIFICANT CHANGE UP (ref 3.5–5)
POTASSIUM UR-SCNC: 18 MMOL/L — SIGNIFICANT CHANGE UP
PROPOXYPHENE QUALITATIVE URINE RESULT: NEGATIVE — SIGNIFICANT CHANGE UP
PROT ?TM UR-MCNC: 7 MG/DLG/24H — SIGNIFICANT CHANGE UP
PROT SERPL-MCNC: 3.8 G/DL — LOW (ref 6–8)
PROT SERPL-MCNC: 3.9 G/DL — LOW (ref 6–8)
PROT SERPL-MCNC: 4 G/DL — LOW (ref 6–8)
PROT SERPL-MCNC: 5.4 G/DL — LOW (ref 6–8)
PROT SERPL-MCNC: 5.7 G/DL — LOW (ref 6–8)
PROT SERPL-MCNC: 6.2 G/DL — SIGNIFICANT CHANGE UP (ref 6–8)
PROT UR-MCNC: SIGNIFICANT CHANGE UP
PROT/CREAT UR-RTO: 0.6 RATIO — HIGH (ref 0–0.2)
PROTHROM AB SERPL-ACNC: 12.1 SEC — SIGNIFICANT CHANGE UP (ref 9.95–12.87)
PROTHROM AB SERPL-ACNC: 12.4 SEC — SIGNIFICANT CHANGE UP (ref 9.95–12.87)
PROTHROM AB SERPL-ACNC: 12.8 SEC — SIGNIFICANT CHANGE UP (ref 9.95–12.87)
RBC # BLD: 3.46 M/UL — LOW (ref 4.2–5.4)
RBC # BLD: 3.5 M/UL — LOW (ref 4.2–5.4)
RBC # BLD: 3.62 M/UL — LOW (ref 4.2–5.4)
RBC # BLD: 3.76 M/UL — LOW (ref 4.2–5.4)
RBC # BLD: 3.94 M/UL
RBC # BLD: 3.98 M/UL — LOW (ref 4.2–5.4)
RBC # BLD: 4.16 M/UL
RBC # FLD: 12.1 % — SIGNIFICANT CHANGE UP (ref 11.5–14.5)
RBC # FLD: 12.2 % — SIGNIFICANT CHANGE UP (ref 11.5–14.5)
RBC # FLD: 12.2 % — SIGNIFICANT CHANGE UP (ref 11.5–14.5)
RBC # FLD: 12.4 % — SIGNIFICANT CHANGE UP (ref 11.5–14.5)
RBC # FLD: 12.7 %
RBC # FLD: 12.8 % — SIGNIFICANT CHANGE UP (ref 11.5–14.5)
RBC # FLD: 13.1 %
SAO2 % BLDA: 100 % — HIGH (ref 94–98)
SAO2 % BLDA: 89 % — LOW (ref 94–98)
SAO2 % BLDA: 94 % — SIGNIFICANT CHANGE UP (ref 94–98)
SAO2 % BLDA: 96 % — SIGNIFICANT CHANGE UP (ref 94–98)
SODIUM SERPL-SCNC: 139 MMOL/L — SIGNIFICANT CHANGE UP (ref 135–146)
SODIUM SERPL-SCNC: 140 MMOL/L — SIGNIFICANT CHANGE UP (ref 135–146)
SODIUM SERPL-SCNC: 142 MMOL/L — SIGNIFICANT CHANGE UP (ref 135–146)
SODIUM SERPL-SCNC: 142 MMOL/L — SIGNIFICANT CHANGE UP (ref 135–146)
SODIUM SERPL-SCNC: 147 MMOL/L — HIGH (ref 135–146)
SODIUM SERPL-SCNC: 153 MMOL/L — HIGH (ref 135–146)
SODIUM SERPL-SCNC: 163 MMOL/L — CRITICAL HIGH (ref 135–146)
SODIUM SERPL-SCNC: 163 MMOL/L — CRITICAL HIGH (ref 135–146)
SODIUM SERPL-SCNC: 164 MMOL/L — CRITICAL HIGH (ref 135–146)
SODIUM SERPL-SCNC: 165 MMOL/L — CRITICAL HIGH (ref 135–146)
SODIUM UR-SCNC: 208 MMOL/L — SIGNIFICANT CHANGE UP
SP GR SPEC: 1.03 — HIGH (ref 1.01–1.02)
TIBC SERPL-MCNC: 224 UG/DL
TIBC SERPL-MCNC: 225 UG/DL
TIBC SERPL-MCNC: 308 UG/DL
TROPONIN T SERPL-MCNC: 2.93 NG/ML — CRITICAL HIGH
TROPONIN T SERPL-MCNC: 5.91 NG/ML — CRITICAL HIGH
TROPONIN T SERPL-MCNC: 8.16 NG/ML — CRITICAL HIGH
TROPONIN T SERPL-MCNC: <0.01 NG/ML — SIGNIFICANT CHANGE UP
TSH SERPL-ACNC: 2 UIU/ML
UIBC SERPL-MCNC: 134 UG/DL
UIBC SERPL-MCNC: 167 UG/DL
UIBC SERPL-MCNC: 274 UG/DL
UROBILINOGEN FLD QL: SIGNIFICANT CHANGE UP
VIT B12 SERPL-MCNC: 411 PG/ML
VIT B12 SERPL-MCNC: 551 PG/ML
WBC # BLD: 11.13 K/UL — HIGH (ref 4.8–10.8)
WBC # BLD: 12.01 K/UL — HIGH (ref 4.8–10.8)
WBC # BLD: 5.56 K/UL — SIGNIFICANT CHANGE UP (ref 4.8–10.8)
WBC # BLD: 5.72 K/UL — SIGNIFICANT CHANGE UP (ref 4.8–10.8)
WBC # BLD: 8.79 K/UL — SIGNIFICANT CHANGE UP (ref 4.8–10.8)
WBC # FLD AUTO: 11.13 K/UL — HIGH (ref 4.8–10.8)
WBC # FLD AUTO: 12.01 K/UL — HIGH (ref 4.8–10.8)
WBC # FLD AUTO: 2.85 K/UL
WBC # FLD AUTO: 3.92 K/UL
WBC # FLD AUTO: 5.56 K/UL — SIGNIFICANT CHANGE UP (ref 4.8–10.8)
WBC # FLD AUTO: 5.72 K/UL — SIGNIFICANT CHANGE UP (ref 4.8–10.8)
WBC # FLD AUTO: 8.79 K/UL — SIGNIFICANT CHANGE UP (ref 4.8–10.8)

## 2019-01-01 PROCEDURE — 99212 OFFICE O/P EST SF 10 MIN: CPT

## 2019-01-01 PROCEDURE — 99213 OFFICE O/P EST LOW 20 MIN: CPT

## 2019-01-01 PROCEDURE — 0042T: CPT

## 2019-01-01 PROCEDURE — 71045 X-RAY EXAM CHEST 1 VIEW: CPT | Mod: 26

## 2019-01-01 PROCEDURE — 93010 ELECTROCARDIOGRAM REPORT: CPT

## 2019-01-01 PROCEDURE — 70450 CT HEAD/BRAIN W/O DYE: CPT | Mod: 26,77

## 2019-01-01 PROCEDURE — 99291 CRITICAL CARE FIRST HOUR: CPT

## 2019-01-01 PROCEDURE — 99254 IP/OBS CNSLTJ NEW/EST MOD 60: CPT

## 2019-01-01 PROCEDURE — 99221 1ST HOSP IP/OBS SF/LOW 40: CPT

## 2019-01-01 PROCEDURE — 37216 TRANSCATH STENT CCA W/O EPS: CPT | Mod: LT

## 2019-01-01 PROCEDURE — 76937 US GUIDE VASCULAR ACCESS: CPT | Mod: 26

## 2019-01-01 PROCEDURE — 70450 CT HEAD/BRAIN W/O DYE: CPT | Mod: 26

## 2019-01-01 PROCEDURE — 74018 RADEX ABDOMEN 1 VIEW: CPT | Mod: 26

## 2019-01-01 PROCEDURE — 93306 TTE W/DOPPLER COMPLETE: CPT | Mod: 26

## 2019-01-01 PROCEDURE — 99223 1ST HOSP IP/OBS HIGH 75: CPT

## 2019-01-01 PROCEDURE — 99231 SBSQ HOSP IP/OBS SF/LOW 25: CPT

## 2019-01-01 RX ORDER — PANTOPRAZOLE SODIUM 20 MG/1
40 TABLET, DELAYED RELEASE ORAL DAILY
Refills: 0 | Status: DISCONTINUED | OUTPATIENT
Start: 2019-01-01 | End: 2019-01-01

## 2019-01-01 RX ORDER — VASOPRESSIN 20 [USP'U]/ML
0.02 INJECTION INTRAVENOUS
Qty: 50 | Refills: 0 | Status: DISCONTINUED | OUTPATIENT
Start: 2019-01-01 | End: 2019-01-01

## 2019-01-01 RX ORDER — IRON SUCROSE 20 MG/ML
200 INJECTION, SOLUTION INTRAVENOUS ONCE
Qty: 0 | Refills: 0 | Status: COMPLETED | OUTPATIENT
Start: 2019-01-01 | End: 2019-01-01

## 2019-01-01 RX ORDER — FUROSEMIDE 40 MG
60 TABLET ORAL ONCE
Refills: 0 | Status: DISCONTINUED | OUTPATIENT
Start: 2019-01-01 | End: 2019-01-01

## 2019-01-01 RX ORDER — CALCIUM CHLORIDE
1 POWDER (GRAM) MISCELLANEOUS ONCE
Refills: 0 | Status: DISCONTINUED | OUTPATIENT
Start: 2019-01-01 | End: 2019-01-01

## 2019-01-01 RX ORDER — IRON SUCROSE 20 MG/ML
200 INJECTION, SOLUTION INTRAVENOUS ONCE
Refills: 0 | Status: COMPLETED | OUTPATIENT
Start: 2019-01-01 | End: 2019-01-01

## 2019-01-01 RX ORDER — FUROSEMIDE 40 MG
20 TABLET ORAL ONCE
Refills: 0 | Status: COMPLETED | OUTPATIENT
Start: 2019-01-01 | End: 2019-01-01

## 2019-01-01 RX ORDER — ACETAMINOPHEN 500 MG
650 TABLET ORAL ONCE
Refills: 0 | Status: COMPLETED | OUTPATIENT
Start: 2019-01-01 | End: 2019-01-01

## 2019-01-01 RX ORDER — SODIUM BICARBONATE 1 MEQ/ML
75 SYRINGE (ML) INTRAVENOUS ONCE
Refills: 0 | Status: DISCONTINUED | OUTPATIENT
Start: 2019-01-01 | End: 2019-01-01

## 2019-01-01 RX ORDER — PROPOFOL 10 MG/ML
5 INJECTION, EMULSION INTRAVENOUS ONCE
Refills: 0 | Status: COMPLETED | OUTPATIENT
Start: 2019-01-01 | End: 2019-01-01

## 2019-01-01 RX ORDER — ASPIRIN/CALCIUM CARB/MAGNESIUM 324 MG
81 TABLET ORAL DAILY
Refills: 0 | Status: DISCONTINUED | OUTPATIENT
Start: 2019-01-01 | End: 2019-01-01

## 2019-01-01 RX ORDER — SODIUM CHLORIDE 5 G/100ML
500 INJECTION, SOLUTION INTRAVENOUS
Refills: 0 | Status: DISCONTINUED | OUTPATIENT
Start: 2019-01-01 | End: 2019-01-01

## 2019-01-01 RX ORDER — SODIUM CHLORIDE 5 G/100ML
150 INJECTION, SOLUTION INTRAVENOUS
Refills: 0 | Status: DISCONTINUED | OUTPATIENT
Start: 2019-01-01 | End: 2019-01-01

## 2019-01-01 RX ORDER — PHENYLEPHRINE HYDROCHLORIDE 10 MG/ML
0.1 INJECTION INTRAVENOUS
Qty: 160 | Refills: 0 | Status: DISCONTINUED | OUTPATIENT
Start: 2019-01-01 | End: 2019-01-01

## 2019-01-01 RX ORDER — HEPARIN SODIUM 5000 [USP'U]/ML
5000 INJECTION INTRAVENOUS; SUBCUTANEOUS EVERY 12 HOURS
Refills: 0 | Status: DISCONTINUED | OUTPATIENT
Start: 2019-01-01 | End: 2019-01-01

## 2019-01-01 RX ORDER — NOREPINEPHRINE BITARTRATE/D5W 8 MG/250ML
0.05 PLASTIC BAG, INJECTION (ML) INTRAVENOUS
Qty: 16 | Refills: 0 | Status: DISCONTINUED | OUTPATIENT
Start: 2019-01-01 | End: 2019-01-01

## 2019-01-01 RX ORDER — FUROSEMIDE 40 MG
40 TABLET ORAL ONCE
Refills: 0 | Status: COMPLETED | OUTPATIENT
Start: 2019-01-01 | End: 2019-01-01

## 2019-01-01 RX ORDER — MIDAZOLAM HYDROCHLORIDE 1 MG/ML
0.02 INJECTION, SOLUTION INTRAMUSCULAR; INTRAVENOUS
Qty: 100 | Refills: 0 | Status: DISCONTINUED | OUTPATIENT
Start: 2019-01-01 | End: 2019-01-01

## 2019-01-01 RX ORDER — HYDROMORPHONE HYDROCHLORIDE 2 MG/ML
0.5 INJECTION INTRAMUSCULAR; INTRAVENOUS; SUBCUTANEOUS ONCE
Refills: 0 | Status: DISCONTINUED | OUTPATIENT
Start: 2019-01-01 | End: 2019-01-01

## 2019-01-01 RX ORDER — MIDAZOLAM HYDROCHLORIDE 1 MG/ML
2 INJECTION, SOLUTION INTRAMUSCULAR; INTRAVENOUS ONCE
Refills: 0 | Status: DISCONTINUED | OUTPATIENT
Start: 2019-01-01 | End: 2019-01-01

## 2019-01-01 RX ORDER — HYDROMORPHONE HYDROCHLORIDE 2 MG/ML
1 INJECTION INTRAMUSCULAR; INTRAVENOUS; SUBCUTANEOUS EVERY 6 HOURS
Refills: 0 | Status: DISCONTINUED | OUTPATIENT
Start: 2019-01-01 | End: 2019-01-01

## 2019-01-01 RX ORDER — SODIUM CHLORIDE 9 MG/ML
1000 INJECTION INTRAMUSCULAR; INTRAVENOUS; SUBCUTANEOUS ONCE
Refills: 0 | Status: COMPLETED | OUTPATIENT
Start: 2019-01-01 | End: 2019-01-01

## 2019-01-01 RX ORDER — NOREPINEPHRINE BITARTRATE/D5W 8 MG/250ML
0.05 PLASTIC BAG, INJECTION (ML) INTRAVENOUS
Qty: 8 | Refills: 0 | Status: DISCONTINUED | OUTPATIENT
Start: 2019-01-01 | End: 2019-01-01

## 2019-01-01 RX ORDER — PROPOFOL 10 MG/ML
5 INJECTION, EMULSION INTRAVENOUS
Qty: 1000 | Refills: 0 | Status: DISCONTINUED | OUTPATIENT
Start: 2019-01-01 | End: 2019-01-01

## 2019-01-01 RX ORDER — CISATRACURIUM BESYLATE 2 MG/ML
3 INJECTION INTRAVENOUS
Qty: 200 | Refills: 0 | Status: DISCONTINUED | OUTPATIENT
Start: 2019-01-01 | End: 2019-01-01

## 2019-01-01 RX ORDER — PHENYLEPHRINE HYDROCHLORIDE 10 MG/ML
0.1 INJECTION INTRAVENOUS
Qty: 40 | Refills: 0 | Status: DISCONTINUED | OUTPATIENT
Start: 2019-01-01 | End: 2019-01-01

## 2019-01-01 RX ORDER — ACETAMINOPHEN 500 MG
650 TABLET ORAL EVERY 6 HOURS
Refills: 0 | Status: DISCONTINUED | OUTPATIENT
Start: 2019-01-01 | End: 2019-01-01

## 2019-01-01 RX ORDER — MAGNESIUM SULFATE 500 MG/ML
2 VIAL (ML) INJECTION ONCE
Refills: 0 | Status: COMPLETED | OUTPATIENT
Start: 2019-01-01 | End: 2019-01-01

## 2019-01-01 RX ORDER — SODIUM BICARBONATE 1 MEQ/ML
0.44 SYRINGE (ML) INTRAVENOUS
Qty: 150 | Refills: 0 | Status: DISCONTINUED | OUTPATIENT
Start: 2019-01-01 | End: 2019-01-01

## 2019-01-01 RX ORDER — CALCIUM CHLORIDE
10 POWDER (GRAM) MISCELLANEOUS ONCE
Refills: 0 | Status: COMPLETED | OUTPATIENT
Start: 2019-01-01 | End: 2019-01-01

## 2019-01-01 RX ORDER — SODIUM BICARBONATE 1 MEQ/ML
0.15 SYRINGE (ML) INTRAVENOUS
Qty: 50 | Refills: 0 | Status: DISCONTINUED | OUTPATIENT
Start: 2019-01-01 | End: 2019-01-01

## 2019-01-01 RX ORDER — CHOLECALCIFEROL (VITAMIN D3) 1250 MCG
1.25 MG CAPSULE ORAL WEEKLY
Qty: 4 | Refills: 0 | Status: ACTIVE | COMMUNITY
Start: 2019-01-01 | End: 1900-01-01

## 2019-01-01 RX ORDER — SODIUM CHLORIDE 9 MG/ML
1000 INJECTION, SOLUTION INTRAVENOUS ONCE
Refills: 0 | Status: DISCONTINUED | OUTPATIENT
Start: 2019-01-01 | End: 2019-01-01

## 2019-01-01 RX ORDER — FUROSEMIDE 40 MG
40 TABLET ORAL ONCE
Refills: 0 | Status: DISCONTINUED | OUTPATIENT
Start: 2019-01-01 | End: 2019-01-01

## 2019-01-01 RX ORDER — CHLORHEXIDINE GLUCONATE 213 G/1000ML
1 SOLUTION TOPICAL
Refills: 0 | Status: DISCONTINUED | OUTPATIENT
Start: 2019-01-01 | End: 2019-01-01

## 2019-01-01 RX ORDER — LEVONORGESTREL/ETHINYL ESTRADIOL AND ETHINYL ESTRADIOL 100-20(84)
0.1-0.02 & 0.01 KIT ORAL DAILY
Qty: 1 | Refills: 3 | Status: ACTIVE | COMMUNITY
Start: 2019-01-01 | End: 1900-01-01

## 2019-01-01 RX ADMIN — Medication 50 GRAM(S): at 06:19

## 2019-01-01 RX ADMIN — IRON SUCROSE 220 MILLIGRAM(S): 20 INJECTION, SOLUTION INTRAVENOUS at 09:08

## 2019-01-01 RX ADMIN — PROPOFOL 5 MILLIGRAM(S): 10 INJECTION, EMULSION INTRAVENOUS at 19:20

## 2019-01-01 RX ADMIN — IRON SUCROSE 200 MILLIGRAM(S): 20 INJECTION, SOLUTION INTRAVENOUS at 10:54

## 2019-01-01 RX ADMIN — MIDAZOLAM HYDROCHLORIDE 1.02 MG/KG/HR: 1 INJECTION, SOLUTION INTRAMUSCULAR; INTRAVENOUS at 09:48

## 2019-01-01 RX ADMIN — Medication 650 MILLIGRAM(S): at 08:23

## 2019-01-01 RX ADMIN — Medication 10 MILLIGRAM(S): at 01:30

## 2019-01-01 RX ADMIN — MIDAZOLAM HYDROCHLORIDE 2 MILLIGRAM(S): 1 INJECTION, SOLUTION INTRAMUSCULAR; INTRAVENOUS at 09:48

## 2019-01-01 RX ADMIN — IRON SUCROSE 220 MILLIGRAM(S): 20 INJECTION, SOLUTION INTRAVENOUS at 13:33

## 2019-01-01 RX ADMIN — IRON SUCROSE 220 MILLIGRAM(S): 20 INJECTION, SOLUTION INTRAVENOUS at 15:36

## 2019-01-01 RX ADMIN — IRON SUCROSE 200 MILLIGRAM(S): 20 INJECTION, SOLUTION INTRAVENOUS at 11:50

## 2019-01-01 RX ADMIN — IRON SUCROSE 200 MILLIGRAM(S): 20 INJECTION, SOLUTION INTRAVENOUS at 11:40

## 2019-01-01 RX ADMIN — IRON SUCROSE 220 MILLIGRAM(S): 20 INJECTION, SOLUTION INTRAVENOUS at 10:05

## 2019-01-01 RX ADMIN — Medication 650 MILLIGRAM(S): at 10:18

## 2019-01-01 RX ADMIN — HEPARIN SODIUM 5000 UNIT(S): 5000 INJECTION INTRAVENOUS; SUBCUTANEOUS at 07:30

## 2019-01-01 RX ADMIN — IRON SUCROSE 220 MILLIGRAM(S): 20 INJECTION, SOLUTION INTRAVENOUS at 11:55

## 2019-01-01 RX ADMIN — CHLORHEXIDINE GLUCONATE 1 APPLICATION(S): 213 SOLUTION TOPICAL at 22:00

## 2019-01-01 RX ADMIN — IRON SUCROSE 220 MILLIGRAM(S): 20 INJECTION, SOLUTION INTRAVENOUS at 09:30

## 2019-01-01 RX ADMIN — SODIUM CHLORIDE 2000 MILLILITER(S): 9 INJECTION INTRAMUSCULAR; INTRAVENOUS; SUBCUTANEOUS at 00:00

## 2019-01-01 RX ADMIN — Medication 260 MILLIGRAM(S): at 06:29

## 2019-01-01 RX ADMIN — HYDROMORPHONE HYDROCHLORIDE 0.5 MILLIGRAM(S): 2 INJECTION INTRAMUSCULAR; INTRAVENOUS; SUBCUTANEOUS at 10:29

## 2019-01-01 RX ADMIN — IRON SUCROSE 220 MILLIGRAM(S): 20 INJECTION, SOLUTION INTRAVENOUS at 10:57

## 2019-01-01 RX ADMIN — IRON SUCROSE 200 MILLIGRAM(S): 20 INJECTION, SOLUTION INTRAVENOUS at 09:00

## 2019-01-01 RX ADMIN — Medication 650 MILLIGRAM(S): at 00:17

## 2019-01-01 RX ADMIN — IRON SUCROSE 220 MILLIGRAM(S): 20 INJECTION, SOLUTION INTRAVENOUS at 09:50

## 2019-01-01 RX ADMIN — PROPOFOL 5 MILLIGRAM(S): 10 INJECTION, EMULSION INTRAVENOUS at 19:19

## 2019-01-01 RX ADMIN — HEPARIN SODIUM 5000 UNIT(S): 5000 INJECTION INTRAVENOUS; SUBCUTANEOUS at 18:11

## 2019-01-01 RX ADMIN — IRON SUCROSE 220 MILLIGRAM(S): 20 INJECTION, SOLUTION INTRAVENOUS at 11:19

## 2019-01-01 RX ADMIN — IRON SUCROSE 200 MILLIGRAM(S): 20 INJECTION, SOLUTION INTRAVENOUS at 10:35

## 2019-01-01 RX ADMIN — Medication 20 MILLIGRAM(S): at 16:08

## 2019-01-01 RX ADMIN — SODIUM CHLORIDE 2000 MILLILITER(S): 9 INJECTION INTRAMUSCULAR; INTRAVENOUS; SUBCUTANEOUS at 02:00

## 2019-01-01 RX ADMIN — PHENYLEPHRINE HYDROCHLORIDE 0.96 MICROGRAM(S)/KG/MIN: 10 INJECTION INTRAVENOUS at 05:40

## 2019-01-01 RX ADMIN — CISATRACURIUM BESYLATE 9.2 MICROGRAM(S)/KG/MIN: 2 INJECTION INTRAVENOUS at 05:40

## 2019-01-01 RX ADMIN — Medication 40 MILLIGRAM(S): at 16:08

## 2019-01-01 RX ADMIN — Medication 650 MILLIGRAM(S): at 09:48

## 2019-01-01 RX ADMIN — Medication 650 MILLIGRAM(S): at 09:47

## 2019-01-01 RX ADMIN — IRON SUCROSE 200 MILLIGRAM(S): 20 INJECTION, SOLUTION INTRAVENOUS at 14:03

## 2019-01-01 RX ADMIN — IRON SUCROSE 200 MILLIGRAM(S): 20 INJECTION, SOLUTION INTRAVENOUS at 10:20

## 2019-01-01 RX ADMIN — PANTOPRAZOLE SODIUM 40 MILLIGRAM(S): 20 TABLET, DELAYED RELEASE ORAL at 15:17

## 2019-01-01 RX ADMIN — IRON SUCROSE 200 MILLIGRAM(S): 20 INJECTION, SOLUTION INTRAVENOUS at 16:00

## 2019-01-01 RX ADMIN — SODIUM CHLORIDE 40 MILLILITER(S): 5 INJECTION, SOLUTION INTRAVENOUS at 10:29

## 2019-01-01 RX ADMIN — Medication 650 MILLIGRAM(S): at 18:45

## 2019-01-01 RX ADMIN — Medication 650 MILLIGRAM(S): at 18:11

## 2019-01-01 RX ADMIN — Medication 40 MILLIGRAM(S): at 19:26

## 2019-01-01 RX ADMIN — HYDROMORPHONE HYDROCHLORIDE 1 MILLIGRAM(S): 2 INJECTION INTRAMUSCULAR; INTRAVENOUS; SUBCUTANEOUS at 19:37

## 2019-01-01 RX ADMIN — VASOPRESSIN 1.2 UNIT(S)/MIN: 20 INJECTION INTRAVENOUS at 05:42

## 2019-01-01 RX ADMIN — CHLORHEXIDINE GLUCONATE 1 APPLICATION(S): 213 SOLUTION TOPICAL at 06:30

## 2019-01-01 RX ADMIN — HYDROMORPHONE HYDROCHLORIDE 0.5 MILLIGRAM(S): 2 INJECTION INTRAMUSCULAR; INTRAVENOUS; SUBCUTANEOUS at 10:07

## 2019-01-01 RX ADMIN — IRON SUCROSE 220 MILLIGRAM(S): 20 INJECTION, SOLUTION INTRAVENOUS at 10:35

## 2019-01-01 RX ADMIN — Medication 2.4 MICROGRAM(S)/KG/MIN: at 10:29

## 2019-01-01 RX ADMIN — Medication 81 MILLIGRAM(S): at 15:18

## 2019-01-01 RX ADMIN — IRON SUCROSE 220 MILLIGRAM(S): 20 INJECTION, SOLUTION INTRAVENOUS at 10:03

## 2019-02-01 PROBLEM — N32.2 VESICOCUTANEOUS FISTULA: Status: RESOLVED | Noted: 2019-01-01 | Resolved: 2019-01-01

## 2019-02-01 PROBLEM — K63.2 ENTEROCUTANEOUS FISTULA: Status: RESOLVED | Noted: 2019-01-01 | Resolved: 2019-01-01

## 2019-02-07 PROBLEM — Z87.42 HISTORY OF UTERINE PROLAPSE: Status: RESOLVED | Noted: 2017-02-24 | Resolved: 2019-01-01

## 2019-02-07 PROBLEM — Z12.4 PAPANICOLAOU SMEAR: Status: RESOLVED | Noted: 2017-02-24 | Resolved: 2019-01-01

## 2019-02-08 NOTE — DISCUSSION/SUMMARY
[FreeTextEntry1] : \par Cervical prolapse-\par The patient was counseled regarding the possible natural progression of prolapse and the clinical consequences of worsening prolapse. The stage and the location of the prolapse was reviewed with the patient. She was counseled regarding the management strategies including observation, pessary placement and surgery.\par \par Discussed that it is only the cervix that is prolapsing and unable to consider a colpocleisis even she was interested (the prolapse is not complete prolapse so a colpocleisis can't be performed). Also explained that the surgery done at age 6 was with the cervical prolapse (really hysteropexy) that was performed with dexon mesh (non permanent mesh) as a bridge from the uterus to the sacrum since the length of the vagina precluded being able to tack the uterus to the sacrum. I feel that she is at great risk of injury to the  and GI tract with intraperitoneal surgery and therefore if she wanted to consider abdominal surgery I would recommend a referral to Dr Anshul Werner. \par \par She could consider a vaginal posterior colporrhaphy/sacrospinous ligament fixation by me and cysto through the stoma by urology (with around 70% success rate ) versus abdominal reconstruction with ARIANE/sacrocolpopexy with synthetic mesh (around 85 to 90% success rate with much higher risks)- would recommend a referral to Dr Anshul Werner at [facility] who also could repair the  and GI reconstructive at the same time if an injury occurs. \par \par The patient voiced understanding and agrees to consider her options.\par \par

## 2019-02-08 NOTE — HISTORY OF PRESENT ILLNESS
[FreeTextEntry1] : \par Pt with pelvic floor dysfunction here for urogynecologic evaluation. She describes: \par Referring provider: Dr Malloy\par PCP: Dr Crystal\par Gyn: Dr Dyson\par \par Chief PFD: prolapse\par \par Records reviewed:\par Day of life #2: bladder closed, suprapubic catheter, ureteral catheters placed\par Age 9: unable to tack the uterus to the sacrum because the length of the vagina was too short. Attached dexon mesh (non permanent) to the anterior and posterior uterus and then attached the mesh to the sacrum. This lengthened the vaginal length. Also had a ilioconduit/continent stoma performed with an abdominal hernia repair at the same time.\par \par Had two vaginoplasty surgeries performed at the age of 9 or 10: tried to develop a clitoral prepuce and labia majora using skin flaps and developing a neovagina. 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 These were repaired by a pediatric surgeon (no operative reports seen).\par \par Pelvic organ prolapse: hx of bladder extrophy, s/p urostomy, s/p continent diversion (ileum)/cervical prolapse/hernia surgery at age 9, s/p fistula repair, +bulge, worsening, presses on perineum for hard stools \par Stress urinary incontinence: self caths, no incontinence from below\par Overactive bladder syndrome: caths through her stoma q 4-5 hrs, no incontinence from below\par Lower urinary tract/vaginal symptoms: ? UTIs per year, no hematuria,Symptoms: odor and back pain. flushes her stoma and then the symptoms go away\par Fecal incontinence: denies\par Defecatory dysfunction: unsure\par Sexual dysfunction: Not sexually active. Due to pain and prolapse. Pain with deeper penetration.\par Pelvic pain: on baclofen prn, intermittent left lower quadrant, more consistent with menses. Cathing alleviates the pain. 7/10 intensity. Currently does not have the pain.\par Vaginal dryness: menorrhagia, being evaluated by general gyn, Denies dryness.\par \par Her pelvic floor symptoms are significantly bothersome and negatively impacting her quality of life. \par \par

## 2019-05-17 NOTE — CHIEF COMPLAINT
[FreeTextEntry1] : Pt currently doing better with menses. Periods are normal in frequency and duration but patient is still having menstrual migraines. Pt requesting continuous ocps to help with that. Pt currently being seen by urogyn at [facility]. Pt planning to have prolapse surgery 10/19 with physicians at [place]. Pt also scheduled to see a urologist at [place].

## 2019-07-23 NOTE — HISTORY OF PRESENT ILLNESS
[de-identified] : Karla is a mel 30 yo lady with history of long standing iron deficiency anemia. She reports no h/o heavy menses and describes her periods as normal and regular. \par Her past medical history is remarkable for h/o bladder extrophy with neobladder creation at age 10. Currently she reports h/i bladder (and ? kidney) stones, she has uterine prolapse. \par She has not had a colonoscopy and is afraid to get the procedure 2/2 multiple intraabdominal surgeries in the past. \par She has had h/o iron infusions in the past.\par Her heritage is Hungarian and Indonesian, there may be a component of thalassemia trait as well.\par She is only symptomatic with fatigue, denies CP, palpitations, dizziness, SOB.\par Oral iron has been ineffective in the past.  [de-identified] : 10/18/2017\par  Ms. Acosta is here today for a follow up visit. She has received two doses of feraheme and repeat cbc shows marked improvement in Hb and iron levels. \par Also noted is persistent leukopenia, mild. She is on gluten free diet along with her mother and she feels it has helped her with migraines. She had endoscopy three years ago and it was not a pleasant experience and she is reluctant to get scoped again. Her menses are regular and normal. Not heavy. She offers no other complaints at this time.\par \par 1/18/2018: Karla presents again for follow up, her iron level is low again, this time she wishes to receive Venofer. She reports recent upper respiratory tract infection that lasted 4-6 weeks, she is concerned about this. We have discussed that her flow cytometry was negative, but gold standard test to diagnosed the cause of mild leukopenia would be bone marrow biopsy, she would like to get this done. She also was advised to see GI, will arrange for visit with Dr. Walker. \par \par 5/18/18: Pt is here for her f/u visit. No fresh complaints. She had EGD and colonoscopy done by Dr Walker. No malignancy or source of bleeding seen. She will undergo capsule endoscopy but Dr Walker wants to give her a dummy pill before doing the actual capsule endoscopy. She has h/o bladder extrophy and was seeing a urogynecologist in the past. Her Hb today is 13.8. \par \par 3/14/18: Pt returns for a follow-up visit. She has no fresh complaints today. She finished her 5 doses of venofer. Her Hb today is 13. Her ferritin improved to 220 and percent sat 33%. She also had a BM biopsy done for leukopenia. It was negative for MDS with normal karyotype and flow cytometry.  She is scheduled to undergo EGD and colonoscopy by Dr Chiu. \par \par 1/23/2019: Patient had EGD/colonoscopy in the past which were normal. SHe had capsule endoscopy in August but she did not get any results. SHe denies and rectal bleed of melena , but reported vaginal bleed for 2 months. SHe describes a protrusion from her vagina , that becomes more prominent in a standing position. SHe did not seek medical attention , but is asking for a referral . She will see Dr Walker on Feb 6.\par \par 3/7/2019 : Patient is feeling much better after IV iron . She was seen by uro/gyn Dr Shi who offered  a vaginal posterior colporrhaphy/sacrospinous ligament fixation and cysto through the stoma by urology (with around 70% success rate ) versus abdominal reconstruction with ARIANE/sacrocolpopexy with synthetic mesh (around 85 to 90% success rate with much higher risks). Patient will go for a second opinion at [place]. She denies any vaginal bleeding for now . She was offered pessary by her GYN Dr Vázquez. If bleeding reoccurs, she will start OCP

## 2019-07-23 NOTE — ASSESSMENT
[FreeTextEntry1] : Iron deficiency anemia, long-standing, unclear etiology \par --Requiring PRN Venofer, reports better response with Venofer than Feraheme in the past \par --PRN iron monitoring and Venofer infusions \par --Patient had capsule endoscopy which showed mild erosive antral gastritis with few scattered small bowel aphtous ulcers, moderately severe distal ileal inflammation with ulcerations and exudates. \par --Dr. Walker following \par \par Leukopenia likely secondary to Topamax.\par -- Bone marrow biopsy negative 2/16/2019 \par \par H/o bladder exstrophy: F/U with urogynecologist. Pt will also need surveillance d/t higher risk of bladder CA.\par --S/p 2nd opinion at [facility], plan for surgery 10/2019 at [facility] for cervical prolapse \par --S/p cystoscopy\par --She will ask for records to be faxed to us \par --Started on OCP by Dr. Dyson 5/2019, continues with vaginal spotting for now \par \par RTC in 2 months with repeat cbc, iron studies.\par

## 2019-07-23 NOTE — ASSESSMENT
[FreeTextEntry1] : Iron deficiency anemia, long-standing, unclear etiology \par --Requiring PRN Venofer, reports better response with Venofer than Feraheme in the past \par -- PRN iron monitoring and Venofer infusions \par -- Patient had capsule endoscopy which showed mild erosive antral gastritis with few scattered small bowel aphtous ulcers, moderately severe distal ileal inflammation with ulcerations and exudates. \par --Dr. Walker following \par \par Leukopenia likely secondary to Topamax.\par -- Bone marrow biopsy negative 2/16/2019 \par \par H/o bladder exstrophy: F/U with urogynecologist. Pt will also need surveillance d/t higher risk of bladder CA.\par --Considering 2nd opinion at [place]\par --Also following with GYN  \par \par RTC in 2 months with repeat cbc, iron studies.\par

## 2019-07-23 NOTE — PHYSICAL EXAM
[Fully active, able to carry on all pre-disease performance without restriction] : Status 0 - Fully active, able to carry on all pre-disease performance without restriction [Normal] : affect appropriate [de-identified] : cervical prolapse  [de-identified] : multiple surgical scars over lower abdomen, well healed

## 2019-07-23 NOTE — PHYSICAL EXAM
[Fully active, able to carry on all pre-disease performance without restriction] : Status 0 - Fully active, able to carry on all pre-disease performance without restriction [Normal] : affect appropriate [de-identified] : multiple surgical scars over lower abdomen, well healed

## 2019-07-23 NOTE — REVIEW OF SYSTEMS
[Fatigue] : fatigue [Dysmenorrhea/Abn Vaginal Bleeding] : dysmenorrhea/abnormal vaginal bleeding [Negative] : Allergic/Immunologic [Fever] : no fever [Night Sweats] : no night sweats [Chills] : no chills [Recent Change In Weight] : ~T no recent weight change [Dysuria] : no dysuria [Incontinence] : no incontinence [Vaginal Discharge] : no vaginal discharge [FreeTextEntry8] : cervical prolapse, protruding from vagina

## 2019-07-23 NOTE — ASSESSMENT
[FreeTextEntry1] : Iron deficiency anemia, long-standing, unclear etiology\par --Iron stores replete with Feraheme with marked response in HB (12.1), but lower again and then repleted with 5 doses of venofer\par --She is requiring PRN Venofer with adequate initial response then gradual drop off \par --Following with Dr. Walker\par --Will repeat iron studies reiodically\par --Patient with h/o bladder extrophy, she was advised to also establish follow up with urology, r/o  source \par \par Leukopenia sustained likely secondary to Topamax.\par -- Bone marrow biopsy on 2/16/2019 negative \par \par Cervical prolapse\par --Will refer to Uro/gyn \par --Possible source of bleeding \par \par RTC in 2 months with repeat cbc, iron studies.

## 2019-07-23 NOTE — REVIEW OF SYSTEMS
[Fatigue] : fatigue [Dysmenorrhea/Abn Vaginal Bleeding] : dysmenorrhea/abnormal vaginal bleeding [Negative] : Allergic/Immunologic [Night Sweats] : no night sweats [Chills] : no chills [Fever] : no fever [Dysuria] : no dysuria [Recent Change In Weight] : ~T no recent weight change [Incontinence] : no incontinence [Vaginal Discharge] : no vaginal discharge [FreeTextEntry8] : uterine prolapse, mass protruding from vagina

## 2019-07-23 NOTE — PHYSICAL EXAM
[Fully active, able to carry on all pre-disease performance without restriction] : Status 0 - Fully active, able to carry on all pre-disease performance without restriction [Normal] : affect appropriate [de-identified] : multiple surgical scars over lower abdomen, well healed [de-identified] : Perineal area examined  : protrusion of cervix from vagina, reducible. no friability or irritation

## 2019-07-23 NOTE — HISTORY OF PRESENT ILLNESS
[de-identified] : Karla is a mel 30 yo lady with history of long standing iron deficiency anemia. She reports no h/o heavy menses and describes her periods as normal and regular. \par Her past medical history is remarkable for h/o bladder extrophy with neobladder creation at age 10. Currently she reports h/i bladder (and ? kidney) stones, she has uterine prolapse. \par She has not had a colonoscopy and is afraid to get the procedure 2/2 multiple intraabdominal surgeries in the past. \par She has had h/o iron infusions in the past.\par Her heritage is [place] and Syriac, there may be a component of thalassemia trait as well.\par She is only symptomatic with fatigue, denies CP, palpitations, dizziness, SOB.\par Oral iron has been ineffective in the past.  [de-identified] : 10/18/2017\par  Ms. Acosta is here today for a follow up visit. She has received two doses of feraheme and repeat cbc shows marked improvement in Hb and iron levels. \par Also noted is persistent leukopenia, mild. She is on gluten free diet along with her mother and she feels it has helped her with migraines. She had endoscopy three years ago and it was not a pleasant experience and she is reluctant to get scoped again. Her menses are regular and normal. Not heavy. She offers no other complaints at this time.\par \par 1/18/2018: Karla presents again for follow up, her iron level is low again, this time she wishes to receive Venofer. She reports recent upper respiratory tract infection that lasted 4-6 weeks, she is concerned about this. We have discussed that her flow cytometry was negative, but gold standard test to diagnosed the cause of mild leukopenia would be bone marrow biopsy, she would like to get this done. She also was advised to see GI, will arrange for visit with Dr. Walker. \par \par 5/18/18: Pt is here for her f/u visit. No fresh complaints. She had EGD and colonoscopy done by Dr Walker. No malignancy or source of bleeding seen. She will undergo capsule endoscopy but Dr Walker wants to give her a dummy pill before doing the actual capsule endoscopy. She has h/o bladder extrophy and was seeing a urogynecologist in the past. Her Hb today is 13.8. \par \par 3/14/18: Pt returns for a follow-up visit. She has no fresh complaints today. She finished her 5 doses of venofer. Her Hb today is 13. Her ferritin improved to 220 and percent sat 33%. She also had a BM biopsy done for leukopenia. It was negative for MDS with normal karyotype and flow cytometry.  She is scheduled to undergo EGD and colonoscopy by Dr Chiu. \par \par 1/23/2019: Patient had EGD/colonoscopy in the past which were normal. She had capsule endoscopy in August but she did not get any results. She denies and rectal bleed of melena , but reported vaginal bleed for 2 months. She describes a protrusion from her vagina, that becomes more prominent in a standing position. She did not seek medical attention, but is asking for a referral. On exam findings are consistent with cervical prolapse, prolapsed cervix has been bleeding/spotting. She will see Dr Walker on Feb 6.

## 2019-07-23 NOTE — REVIEW OF SYSTEMS
[Fatigue] : fatigue [Dysmenorrhea/Abn Vaginal Bleeding] : dysmenorrhea/abnormal vaginal bleeding [Negative] : Allergic/Immunologic [Chills] : no chills [Fever] : no fever [Recent Change In Weight] : ~T no recent weight change [Night Sweats] : no night sweats [Incontinence] : no incontinence [Dysuria] : no dysuria [Vaginal Discharge] : no vaginal discharge [FreeTextEntry8] : cervical prolapse, mass protruding from vagina

## 2019-08-09 PROBLEM — N92.6 IRREGULAR MENSES: Status: ACTIVE | Noted: 2019-01-01

## 2019-08-09 NOTE — CHIEF COMPLAINT
[FreeTextEntry1] : Pt here for follow up of Camresse therapy to help with irregular bleeding and menstrual migraines. Pt having break through bleeding every 2 weeks while taking these pills. Pt still has 1.5 weeks left until the placebo pills at the end of the pill pack. Pt concerned that topimax is causing the irregular bleeding. Pt still scheduled for urogyn surgery in 10/19 at [facility].

## 2019-09-27 PROBLEM — Z00.00 ENCOUNTER FOR PREVENTIVE HEALTH EXAMINATION: Status: ACTIVE | Noted: 2017-02-24

## 2019-10-13 PROBLEM — D50.9 ANEMIA, IRON DEFICIENCY: Status: ACTIVE | Noted: 2017-09-08

## 2019-10-13 NOTE — HISTORY OF PRESENT ILLNESS
[de-identified] : Karla is a mel 32 yo lady with history of long standing iron deficiency anemia. She reports no h/o heavy menses and describes her periods as normal and regular. \par Her past medical history is remarkable for h/o bladder extrophy with neobladder creation at age 10. Currently she reports h/i bladder (and ? kidney) stones, she has uterine prolapse. \par She has not had a colonoscopy and is afraid to get the procedure 2/2 multiple intraabdominal surgeries in the past. \par She has had h/o iron infusions in the past.\par Her heritage is Burmese and Welsh, there may be a component of thalassemia trait as well.\par She is only symptomatic with fatigue, denies CP, palpitations, dizziness, SOB.\par Oral iron has been ineffective in the past.  [de-identified] : 10/18/2017\par  Ms. Acosta is here today for a follow up visit. She has received two doses of feraheme and repeat cbc shows marked improvement in Hb and iron levels. \par Also noted is persistent leukopenia, mild. She is on gluten free diet along with her mother and she feels it has helped her with migraines. She had endoscopy three years ago and it was not a pleasant experience and she is reluctant to get scoped again. Her menses are regular and normal. Not heavy. She offers no other complaints at this time.\par \par 1/18/2018: Karla presents again for follow up, her iron level is low again, this time she wishes to receive Venofer. She reports recent upper respiratory tract infection that lasted 4-6 weeks, she is concerned about this. We have discussed that her flow cytometry was negative, but gold standard test to diagnosed the cause of mild leukopenia would be bone marrow biopsy, she would like to get this done. She also was advised to see GI, will arrange for visit with Dr. Walker. \par \par 5/18/18: Pt is here for her f/u visit. No fresh complaints. She had EGD and colonoscopy done by Dr Walker. No malignancy or source of bleeding seen. She will undergo capsule endoscopy but Dr Walker wants to give her a dummy pill before doing the actual capsule endoscopy. She has h/o bladder extrophy and was seeing a urogynecologist in the past. Her Hb today is 13.8. \par \par 3/14/18: Pt returns for a follow-up visit. She has no fresh complaints today. She finished her 5 doses of venofer. Her Hb today is 13. Her ferritin improved to 220 and percent sat 33%. She also had a BM biopsy done for leukopenia. It was negative for MDS with normal karyotype and flow cytometry.  She is scheduled to undergo EGD and colonoscopy by Dr Chiu. \par \par 1/23/2019: Patient had EGD/colonoscopy in the past which were normal. SHe had capsule endoscopy in August but she did not get any results. SHe denies and rectal bleed of melena , but reported vaginal bleed for 2 months. SHe describes a protrusion from her vagina , that becomes more prominent in a standing position. SHe did not seek medical attention , but is asking for a referral . She will see Dr Walker on Feb 6.\par \par 3/7/2019 : Patient is feeling much better after IV iron . She was seen by uro/gyn Dr Shi who offered  a vaginal posterior colporrhaphy/sacrospinous ligament fixation and cysto through the stoma by urology (with around 70% success rate ) versus abdominal reconstruction with ARIANE/sacrocolpopexy with synthetic mesh (around 85 to 90% success rate with much higher risks). Patient will go for a second opinion at [place]. She denies any vaginal bleeding for now . She was offered pessary by her GYN Dr Vázquez. If bleeding reoccurs, she will start OCP \par \par 7/23/2019: Mary is here for follow up. She reports ongoing vaginal bleeding and now spotting since she was started on OCP by Dr. Dyson. She will require 5 additional dose of Venofer, will start today. She has had multiple follow ups at [facility] including Urology, Nephrology, UroGyn, she is planned for surgery with UroGyn at the end of 10/2019 for cervical prolapse. Overall she is doing well, denies GIB. \par \par 10/1/19: Patient came in for a follow up visit, she reported feeling better, she has had multiple follow ups at [facility] including Urology, Nephrology, UroGyn, she is planned for surgery with Uro Gyn at the end of 10/2019 for cervical prolapse. Today she denies GIB.

## 2019-10-13 NOTE — ASSESSMENT
[FreeTextEntry1] : Iron deficiency anemia, long-standing, unclear etiology \par --Requiring PRN Venofer, reports better response with Venofer than Feraheme in the past \par --PRN iron monitoring and Venofer infusions \par --Patient had capsule endoscopy which showed mild erosive antral gastritis with few scattered small bowel aphthous ulcers, moderately severe distal ileal inflammation with ulcerations and exudates. \par --Dr. Walker following \par \par Leukopenia likely secondary to Topamax.\par -- Bone marrow biopsy negative 2/16/2019 \par \par H/o bladder exstrophy: F/U with urogynecologist. Pt will also need surveillance d/t higher risk of bladder CA.\par --S/p 2nd opinion at [place], plan for surgery 10/2019 at [place] for cervical prolapse \par --S/p cystoscopy\par --She will ask for records to be faxed to us \par --Started on OCP by Dr. Dyson 5/2019, continues with vaginal spotting for now \par \par RTC in 2 months with repeat cbc, iron studies.\par

## 2019-10-13 NOTE — REVIEW OF SYSTEMS
[Fatigue] : fatigue [Dysmenorrhea/Abn Vaginal Bleeding] : dysmenorrhea/abnormal vaginal bleeding [Negative] : Heme/Lymph [Fever] : no fever [Chills] : no chills [Night Sweats] : no night sweats [Recent Change In Weight] : ~T no recent weight change [Dysuria] : no dysuria [Incontinence] : no incontinence [Vaginal Discharge] : no vaginal discharge [FreeTextEntry8] : cervical prolapse, protruding from vagina

## 2019-10-13 NOTE — PHYSICAL EXAM
[Restricted in physically strenuous activity but ambulatory and able to carry out work of a light or sedentary nature] : Status 1- Restricted in physically strenuous activity but ambulatory and able to carry out work of a light or sedentary nature, e.g., light house work, office work [Normal] : grossly intact [de-identified] : multiple surgical scars over lower abdomen, well healed [de-identified] : cervical prolapse
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
## 2019-11-30 NOTE — PROGRESS NOTE ADULT - ASSESSMENT
IMP: Left ICA dissection with resultant Left MCA stroke s/p DSA/stenting/thrombectomy and failed recannulatization    Plan: Please continue IR post angio precautions          Keep (R) leg straight - Groin checks q1H          Check DP pulses vs dopplers          Please obtain post op labs -cmp, ptt/pt/inr, CBC, serum osmo/Urine lytes          Keep HOB at 30 - reverse [place] flat          Keep NA goals 140-145          Keep -170 for adequate perfusion given poor collateral vessels          Neuro checks q 1H with NIH score _ please call neurology if Na decreases         Follow up HCT 6hour post - 4:30am          Now on Dontae crani watch - please call Neuro/Neurosurgery for increased lethargy, change in NIH score, Dilated or unreactive pupils or acute changes in head CT         HOLD anticoagulation - SCD boots IMP: Left ICA dissection with resultant Left MCA stroke s/p DSA/stenting/thrombectomy and failed recannulatization    Plan: Please continue IR post angio precautions          Keep (R) leg straight - Groin checks q1H          Check DP pulses vs dopplers          Please obtain post op labs -cmp, ptt/pt/inr, CBC, serum osmo/Urine lytes          Keep HOB at 30 - reverse [place] flat          Keep NA goals 140-145 - HTS 3% prn            Bolus PRN 1-2L PRN for volume resuscitation          Keep -170 for adequate perfusion given poor collateral vessels          Neuro checks q 1H with NIH score _ please call neurology if NIH decreases         Follow up HCT 6hour post - 4:30am          Now on Dontae crani watch - please call Neuro/Neurosurgery for increased lethargy, change in NIH score, Dilated or unreactive pupils or acute changes in head CT         HOLD anticoagulation - SCD boots

## 2019-11-30 NOTE — CHART NOTE - NSCHARTNOTEFT_GEN_A_CORE
PACU ANESTHESIA ADMISSION NOTE      Procedure:   Post op diagnosis:      ____  Intubated  TV:______       Rate: ______      FiO2: ______    __x__  Patent Airway    ____  Full return of protective reflexes    ____  Full recovery from anesthesia / back to baseline     Vitals:   T:   98.6F        R: 16                 BP: 110/70                 Sat:100                   P: 106       Mental Status:  _x___ Awake   _____ Alert   _____ Drowsy   _____ Sedated    Nausea/Vomiting:  __x__ NO  ______Yes,   See Post - Op Orders          Pain Scale (0-10):  __0___    Treatment: ____ None    ____ See Post - Op/PCA Orders    Post - Operative Fluids:   ____ Oral   x____ See Post - Op Orders    Plan: Discharge:   ____Home       _____Floor     ____x_Critical Care    _____  Other:_________________    Comments: Pt awake, VS stable, no anesthesia complications. Report given to neurology KEITH Valentin.

## 2019-11-30 NOTE — STROKE CODE NOTE - NIH STROKE SCALE: 10. DYSARTHRIA, QM
"Anesthesia Post Evaluation    Patient: Elizabeth Navarrete    Procedure(s) Performed: Procedure(s) (LRB):  ESOPHAGOGASTRODUODENOSCOPY (EGD) (N/A)    Final Anesthesia Type: MAC  Patient location during evaluation: GI PACU  Patient participation: Yes- Able to Participate  Level of consciousness: awake and alert and oriented  Post-procedure vital signs: reviewed and stable  Pain management: adequate  Airway patency: patent  PONV status at discharge: No PONV  Anesthetic complications: no      Cardiovascular status: blood pressure returned to baseline  Respiratory status: unassisted, spontaneous ventilation and room air  Hydration status: euvolemic  Follow-up not needed.        Visit Vitals  BP (!) 151/79   Pulse 96   Temp 36.7 °C (98.1 °F)   Resp 16   Ht 5' 6" (1.676 m)   Wt 53.6 kg (118 lb 2.7 oz)   LMP  (LMP Unknown)   SpO2 96%   Breastfeeding? No   BMI 19.07 kg/m²       Pain/Toño Score: Pain Assessment Performed: Yes (6/14/2018  9:00 AM)  Presence of Pain: denies (6/14/2018  9:00 AM)      " (2) Severe dysarthria; patients speech is so slurred as to be unintelligible in the absence of or out of proportion to any dysphasia, or is mute/anarthric
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
## 2019-11-30 NOTE — ED PROVIDER NOTE - PHYSICAL EXAMINATION
CONSTITUTIONAL: well developed, well nourished, no acute distress  HEAD: normocephalic, atraumatic  EYES: PERRL at 3 mm, EOMI, no raccoon eyes, dysconjugate gaze  ENT: no nasal discharge, no septal hematoma, no villarreal sign, moist mucous membranes  NECK: no midline tenderness, no stepoffs, no deformity  CV: regular rate and rhythm, equal distal pulses  RESP: lungs clear to auscultation bilaterally, normal work of breathing, symmetric rise and fall of chest   CHEST: no chest wall tenderness, no crepitus, no clavicular deformity/tenting  ABD: soft, nondistended, nontender, no rebound, no guarding, no rigidity, no seatbelt sign, no pelvic instability  BACK: no midline T/L/S-spine tenderness, no stepoffs, no deformity  EXT: no obvious deformity, no tenderness of extremities, full ROM in LUE/LLE, cap refill < 2 seconds  SKIN: no rashes, no lacerations, no abrasions  NEURO: awake, alert, moves LUE/LLE, no spontaneous movement in RUE/RLE, 1/5 motor strength in RUE/RLE 2/2 pain  PSYCH: normal mood, appropriate affect CONSTITUTIONAL: well developed, well nourished, no acute distress  HEAD: normocephalic, atraumatic  EYES: PERRL at 3 mm, EOMI, no raccoon eyes, dysconjugate gaze  ENT: no nasal discharge, no septal hematoma, no villarreal sign, moist mucous membranes  NECK: no midline tenderness, no stepoffs, no deformity  CV: regular rate and rhythm, equal distal pulses  RESP: lungs clear to auscultation bilaterally, normal work of breathing, symmetric rise and fall of chest   CHEST: no chest wall tenderness, no crepitus, no clavicular deformity/tenting  ABD: soft, nondistended, nontender, no rebound, no guarding, no rigidity, no seatbelt sign, no pelvic instability  BACK: no midline T/L/S-spine tenderness, no stepoffs, no deformity  EXT: no obvious deformity, no tenderness of extremities, full ROM in LUE/LLE, cap refill < 2 seconds  SKIN: no rashes, no lacerations, no abrasions  NEURO: awake, alert, moves LUE/LLE, no spontaneous movement in RUE/RLE, 1/5 motor strength in RUE/RLE 2/2 pain, L facial droop  PSYCH: normal mood, appropriate affect

## 2019-11-30 NOTE — H&P ADULT - NSHPSOCIALHISTORY_GEN_ALL_CORE
Substance Use (street drugs): ( x ) never used  (  ) other:  Tobacco Usage:  ( x  ) never smoked   (   ) former smoker   (   ) current smoker  (     ) pack year  Alcohol Usage: None
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
## 2019-11-30 NOTE — H&P ADULT - ASSESSMENT
33 yo female     ACTIVE PROBLEMS  Acute stroke -Left MCA complete occlusion  Left carotid artery dissection    CHRONIC PROBLEMS  Migraines  Anemia  Bladder exstrophy    PLAN: 33 yo female     ACTIVE PROBLEMS  Acute stroke -Left MCA complete occlusion  Left carotid artery dissection    CHRONIC PROBLEMS  Migraines  Anemia  Bladder exstrophy    PLAN:    CNS:   NO sedation  Neurochecks q1hrly  s/p thrombectomy  Repeat CTH in the AM  F/u Neurosurgery and neurology recs    PULMONARY:   HOB @ 45 degrees. Aspiration precautions    CARDIOVASCULAR:   I=O  SBP goal:     GI:   GI prophylaxis.    RENAL:    Follow up lytes.   Correct as needed.     INFECTIOUS DISEASE:   Follow up cultures.    HEMATOLOGICAL:    DVT prophylaxis -    ENDOCRINE:   Follow up FS. Insulin protocol if needed    MUSCULOSKELETAL: Increase as tolerated  Check ESR, WHITNEY (to rule out any vascular pathology for dissection)    MICU monitoring. 35 yo female     ACTIVE PROBLEMS  Acute stroke -Left MCA complete occlusion  Left carotid artery dissection    CHRONIC PROBLEMS  Migraines  Anemia  Bladder exstrophy    PLAN:    CNS:   NO sedation  Neurochecks q15min for 2 hours, then q30min for 4 hours, then q1hr for 18 hours  s/p thrombectomy   Repeat CTH in 24 hours  F/u Neurosurgery and neurology recs    PULMONARY:   HOB @ 45 degrees. Aspiration precautions    CARDIOVASCULAR:   I=O  SBP goal: 110-140mmHg. Can us NS as IVF to keep within the SBP range.     GI:   GI prophylaxis.    RENAL:    Follow up lytes.   Correct as needed.     INFECTIOUS DISEASE:   Follow up cultures.    HEMATOLOGICAL:    DVT prophylaxis -SCD    ENDOCRINE:   Follow up FS. Insulin protocol if needed    MUSCULOSKELETAL: Increase as tolerated  Check ESR, WHITNEY (to rule out any vascular pathology for dissection)    MICU monitoring.

## 2019-11-30 NOTE — H&P ADULT - NSHPLABSRESULTS_GEN_ALL_CORE
12.5   5.72  )-----------( 110      ( 30 Nov 2019 16:28 )             38.1       11-30    142  |  110  |  12  ----------------------------<  116<H>  3.9   |  18  |  0.8    Ca    8.6      30 Nov 2019 16:28    TPro  6.2  /  Alb  4.3  /  TBili  0.3  /  DBili  x   /  AST  12  /  ALT  12  /  AlkPhos  40  11-30    PT/INR - ( 30 Nov 2019 16:28 )   PT: 12.10 sec;   INR: 1.05 ratio    PTT - ( 30 Nov 2019 16:28 )  PTT:25.6 sec    CARDIAC MARKERS ( 30 Nov 2019 16:28 )  x     / <0.01 ng/mL / x     / x     / x        < from: CT Brain Stroke Protocol (11.30.19 @ 16:44) >    IMPRESSION:  Left dense MCA concerning for underlying thrombus with decreased   definition of the left insular ribbon.    < from: CT Perfusion w/ Maps w/ IV Cont (11.30.19 @ 17:42) >    IMPRESSION:    1.  Complete flame shaped occlusion of the left ICA, 2 cm distal to the   bifurcation, suggesting dissection    2.  Reconstituted paraclinoid ICA, with complete MCA occlusion.    3.  Findings suggest dissection, with clot migration to completely   occlude the MCA. Poor collateral flow based on anatomic maps to the MCA   territory    4.  Rapid perfusion maps demonstrate a mismatch volume of 35 cc. About 70   cc volume of MCA CBF < 30%    5.  The patient may have underlying fibromuscular dysplasia. The distal   right cervical ICA is tortuous, dilated and probably has a short segment   nonocclusive dissection    6.  Left vertebral artery originates from the aortic arch. The vertebral   basilar system is patent
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
## 2019-11-30 NOTE — ED ADULT NURSE NOTE - NSIMPLEMENTINTERV_GEN_ALL_ED
Implemented All Fall with Harm Risk Interventions:  [place] to call system. Call bell, personal items and telephone within reach. Instruct patient to call for assistance. Room bathroom lighting operational. Non-slip footwear when patient is off stretcher. Physically safe environment: no spills, clutter or unnecessary equipment. Stretcher in lowest position, wheels locked, appropriate side rails in place. Provide visual cue, wrist band, yellow gown, etc. Monitor gait and stability. Monitor for mental status changes and reorient to person, place, and time. Review medications for side effects contributing to fall risk. Reinforce activity limits and safety measures with patient and family. Provide visual clues: red socks.
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
## 2019-11-30 NOTE — ED PROVIDER NOTE - CLINICAL SUMMARY MEDICAL DECISION MAKING FREE TEXT BOX
Pt w/ CVA. outside window for tPA. + dense L MCA. IR team activated. Neuro discussed case with mother via phone and obtained consent for interventional procedure. Pt taken to IR suite for endovascular therapy, admitted to ICU

## 2019-11-30 NOTE — ED PROVIDER NOTE - NS HIV RISK FACTOR YES
Pt c/o severe right upper and lower quadrant abdominal pain with radiation into the groin. Pt reports pain worse throughout the night, becoming diaphoretic, nausea and vomiting, bowel movement; denies diarrhea.      Esther Wing RN  07/09/19 [id number]     Unable to consent due to medical condition
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
## 2019-11-30 NOTE — ED ADULT NURSE REASSESSMENT NOTE - NS ED NURSE REASSESS COMMENT FT1
Patient at this time will go to IR for intervention, will continue to watch and assess patient, safety and comfort measures maintained.

## 2019-11-30 NOTE — H&P ADULT - NSHPREVIEWOFSYSTEMS_GEN_ALL_CORE
REVIEW OF SYSTEMS    Could not be obtained-pt aphasic  NEUROLOGICAL: Aphasia, right sided weakness, left forced gaze      Vital Signs Last 24 Hrs  T(C): 37.2 (30 Nov 2019 16:28), Max: 37.2 (30 Nov 2019 16:28)  T(F): 98.9 (30 Nov 2019 16:28), Max: 98.9 (30 Nov 2019 16:28)  HR: 79 (30 Nov 2019 17:04) (78 - 79)  BP: 89/60 (30 Nov 2019 17:04) (89/60 - 105/68)  RR: 16 (30 Nov 2019 17:04) (16 - 18)  SpO2: 99% (30 Nov 2019 17:04) (98% - 99%)
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
## 2019-12-01 NOTE — CONSULT NOTE ADULT - ASSESSMENT
Impression:  Left ICA dissection with left MCA thrombus sp thrombectomy and stenting.  Right ICA dissection   HO Bladder reconstruction with bladders stoma    PLAN:    CNS: Follow neurology, neurosurgery and IR. Patient might need aspirin post tent. Clarify with IR. Repeat CT head. Neuro checks Q1 hr.     HEENT: Oral care    PULMONARY:  HOB @ 45 degrees. Low threshold for intubation.     CARDIOVASCULAR: I=O. Continue with 3% FOR NOW. Maintain Sodium level 150-155. ECHO    GI: GI prophylaxis.  NPO for now.     RENAL:  Follow up lytes.  Correct as needed    INFECTIOUS DISEASE: Follow up cultures    HEMATOLOGICAL:  DVT prophylaxis.    ENDOCRINE:  Follow up FS.  Insulin protocol if needed.    MUSCULOSKELETAL: Bed rest  MICU for now.

## 2019-12-01 NOTE — PROGRESS NOTE ADULT - SUBJECTIVE AND OBJECTIVE BOX
Hospital Course:   POD# 1, s/p IR thrombectomy for left MCA infarct which was unsuccessful, stenting of left carotid dissection      Vital Signs Last 24 Hrs  T(C): 36.6 (01 Dec 2019 08:00), Max: 37.2 (2019 16:28)  T(F): 97.8 (01 Dec 2019 08:00), Max: 98.9 (2019 16:28)  HR: 76 (01 Dec 2019 10:) (46 - 116)  BP: 114/52 (01 Dec 2019 07:) (89/60 - 123/50)  BP(mean): 82 (01 Dec 2019 07:) (72 - 88)  RR: 32 (01 Dec 2019 10:) (16 - 46)  SpO2: 100% (01 Dec 2019 10:) (92% - 100%)    I&O's Detail    2019 07:  -  01 Dec 2019 07:00  --------------------------------------------------------  IN:    norepinephrine Infusion: 133.8 mL    phenylephrine   Infusion: 19.2 mL    Sodium Chloride 0.9% IV Bolus: 2225 mL    sodium chloride 3%.: 80 mL  Total IN: 2458 mL    OUT:    Indwelling Catheter - Stomal: 2085 mL  Total OUT: 2085 mL    Total NET: 373 mL      01 Dec 2019 07:  -  01 Dec 2019 11:04  --------------------------------------------------------  IN:    norepinephrine Infusion: 220.5 mL    sodium chloride 3%.: 80 mL  Total IN: 300.5 mL    OUT:  Total OUT: 0 mL    Total NET: 300.5 mL        I&O's Summary      -  01 Dec 2019 07:00  --------------------------------------------------------  IN: 2458 mL / OUT: 2085 mL / NET: 373 mL    01 Dec 2019 07:01  -  01 Dec 2019 11:04  --------------------------------------------------------  IN: 300.5 mL / OUT: 0 mL / NET: 300.5 mL        PHYSICAL EXAM:  Neurological: awake, alert, following commands, slight tongue deviation and slight drooping of rt mouth, PERRL, nonverbal puts up 2 fingers left side, puuls right arm in, not extending, moves both legs spontaneously      LABS:                        12.2   12. )-----------( 166      ( 01 Dec 2019 04:30 )             36.7     12    140  |  112<H>  |  5<L>  ----------------------------<  152<H>  3.6   |  13<L>  |  0.5<L>    Ca    7.8<L>      01 Dec 2019 04:30  Phos  2.2     11-30  Mg     1.6         TPro  5.7<L>  /  Alb  3.7  /  TBili  0.4  /  DBili  x   /  AST  17  /  ALT  12  /  AlkPhos  40  12    PT/INR - ( 01 Dec 2019 04:30 )   PT: 12.80 sec;   INR: 1.11 ratio         PTT - ( 01 Dec 2019 04:30 )  PTT:24.7 sec  Urinalysis Basic - ( 2019 17:50 )    Color: Light Yellow / Appearance: Clear / S.026 / pH: x  Gluc: x / Ketone: Negative  / Bili: Negative / Urobili: <2 mg/dL   Blood: x / Protein: Trace / Nitrite: Negative   Leuk Esterase: Negative / RBC: x / WBC x   Sq Epi: x / Non Sq Epi: x / Bacteria: x      CARDIAC MARKERS ( 2019 16:28 )  x     / <0.01 ng/mL / x     / x     / x          CAPILLARY BLOOD GLUCOSE      POCT Blood Glucose.: 138 mg/dL (01 Dec 2019 05:11)  POCT Blood Glucose.: 94 mg/dL (2019 16:27)      Drug Levels: [] N/A    CSF Analysis: [] N/A      Allergies    latex (Rash; Urticaria)  No Known Drug Allergies    Intolerances      MEDICATIONS:  Antibiotics:    Neuro:    Anticoagulation:  heparin  Injectable 5000 Unit(s) SubCutaneous every 12 hours    OTHER:  chlorhexidine 4% Liquid 1 Application(s) Topical <User Schedule>  norepinephrine Infusion 0.05 MICROgram(s)/kG/Min IV Continuous <Continuous>    IVF:  sodium chloride 3%. 500 milliLiter(s) IV Continuous <Continuous>    CULTURES:    RADIOLOGY & ADDITIONAL TESTS:   < from: CT Head No Cont (19 @ 04:46) >  Findings/  Impression:     Continued small increase in loss of grey-white differentiation within the   left cerebral hemisphere in the MCA distribution compatible with evolving   stroke. Stable hyperdensity within the right basal ganglia compatible   with contrast staining post thrombectomy. Decrease in air within the   vasculature ofthe left cerebral hemisphere.        < end of copied text >      ASSESSMENT:  34y Female s/p IR intervention for left MCA stroke, stenting of left carotid dissection, evolving stroke    STROKE  ^STROKE  Handoff  MEWS Score  Bladder exstrophy  Anemia  Migraines  Stroke  Bladder extrophy  AMS  90+      PLAN: Continue as per IR, Medical ICU to f/u with IR about anticoagulation with ASA, Plavix, increase 3% saline from 20- 30cc's to push Na up to 155, check Na q6 hrs, Neurology, medical management, repeat HCT later today.

## 2019-12-01 NOTE — CHART NOTE - NSCHARTNOTEFT_GEN_A_CORE
The patient was noted to be relatively bradycardic after starting sotero-synephrine. Discontinued it and started on levophed instead. Will continue to monitor.

## 2019-12-01 NOTE — SWALLOW BEDSIDE ASSESSMENT ADULT - SLP GENERAL OBSERVATIONS
Pt. received asleep, unable to arouse despite of max verbal/tactile stim, room air. Accompanied by cousin at bedside.
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
## 2019-12-01 NOTE — PROGRESS NOTE ADULT - SUBJECTIVE AND OBJECTIVE BOX
Hospital Course:   POD# s/p left MCA infarct, stenting to left carotid artery       Vital Signs Last 24 Hrs  T(C): 36.7 (01 Dec 2019 16:00), Max: 37.2 (2019 18:14)  T(F): 98 (01 Dec 2019 16:00), Max: 98.1 (01 Dec 2019 05:00)  HR: 98 (01 Dec 2019 17:00) (46 - 116)  BP: 120/83 (01 Dec 2019 16:45) (89/60 - 123/50)  BP(mean): 108 (01 Dec 2019 16:45) (72 - 108)  RR: 49 (01 Dec 2019 17:00) (16 - 50)  SpO2: 93% (01 Dec 2019 17:00) (92% - 100%)    I&O's Detail    2019 07:  -  01 Dec 2019 07:00  --------------------------------------------------------  IN:    norepinephrine Infusion: 133.8 mL    phenylephrine   Infusion: 19.2 mL    Sodium Chloride 0.9% IV Bolus: 2225 mL    sodium chloride 3%: 80 mL  Total IN: 2458 mL    OUT:    Indwelling Catheter - Stomal: 2085 mL  Total OUT: 2085 mL    Total NET: 373 mL      01 Dec 2019 07:  -  01 Dec 2019 17:08  --------------------------------------------------------  IN:    norepinephrine Infusion: 220.5 mL    norepinephrine Infusion: 532.1 mL    sodium chloride 3%: 360 mL  Total IN: 1112.6 mL    OUT:    Indwelling Catheter - Stomal: 560 mL  Total OUT: 560 mL    Total NET: 552.6 mL        I&O's Summary    2019 07:  -  01 Dec 2019 07:00  --------------------------------------------------------  IN: 2458 mL / OUT: 2085 mL / NET: 373 mL    01 Dec 2019 07:01  -  01 Dec 2019 17:08  --------------------------------------------------------  IN: 1112.6 mL / OUT: 560 mL / NET: 552.6 mL        PHYSICAL EXAM:  Neurological- awake, eyes open, slight left eye lid lag, follows commands, puts up 2 fingers when asked, moves both legs, left better than right, PERRL, non verbal,                 LABS:                        12.2   12. )-----------( 166      ( 01 Dec 2019 04:30 )             36.7     12    142  |  113<H>  |  8<L>  ----------------------------<  191<H>  4.1   |  11<L>  |  0.8    Ca    8.1<L>      01 Dec 2019 11:05  Phos  2.2     -30  Mg     1.6         TPro  5.7<L>  /  Alb  3.7  /  TBili  0.4  /  DBili  x   /  AST  17  /  ALT  12  /  AlkPhos  40      PT/INR - ( 01 Dec 2019 04:30 )   PT: 12.80 sec;   INR: 1.11 ratio         PTT - ( 01 Dec 2019 04:30 )  PTT:24.7 sec  Urinalysis Basic - ( 2019 17:50 )    Color: Light Yellow / Appearance: Clear / S.026 / pH: x  Gluc: x / Ketone: Negative  / Bili: Negative / Urobili: <2 mg/dL   Blood: x / Protein: Trace / Nitrite: Negative   Leuk Esterase: Negative / RBC: x / WBC x   Sq Epi: x / Non Sq Epi: x / Bacteria: x      CARDIAC MARKERS ( 2019 16:28 )  x     / <0.01 ng/mL / x     / x     / x          CAPILLARY BLOOD GLUCOSE      POCT Blood Glucose.: 138 mg/dL (01 Dec 2019 05:11)      Drug Levels: [] N/A    CSF Analysis: [] N/A      Allergies    latex (Rash; Urticaria)  No Known Drug Allergies    Intolerances      MEDICATIONS:  Antibiotics:    Neuro:    Anticoagulation:  heparin  Injectable 5000 Unit(s) SubCutaneous every 12 hours    OTHER:  chlorhexidine 4% Liquid 1 Application(s) Topical <User Schedule>  norepinephrine Infusion 0.05 MICROgram(s)/kG/Min IV Continuous <Continuous>    IVF:  sodium chloride 3%. 500 milliLiter(s) IV Continuous <Continuous>    CULTURES:    RADIOLOGY & ADDITIONAL TESTS: official reading pending, seen by Dr Gustafson, slight increase of MLS in some areas, left MCA infarct still evolving with areas of demarcation      ASSESSMENT:  34y Female s/p left MCA stroke, attempted thrombectomy, stenting to left carotid artery    STROKE  ^STROKE  Handoff  MEWS Score  Bladder exstrophy  Anemia  Migraines  Stroke  Bladder extrophy  AMS  90+      PLAN: d/w Dr Gustafson and medical resident, continue frequent neuro checks, increase 3% to 50 cc hr, push Na to 155, NA levels q 6hrs, repeat HCT if any change in mental status, ok to start ASA 81mg, RHCT in am

## 2019-12-01 NOTE — SWALLOW BEDSIDE ASSESSMENT ADULT - SWALLOW EVAL: DIAGNOSIS
Pt. lethargic, unresponsive to verbal, tactile stim, poor mental status and therefore not appropriate for PO trials at this time.

## 2019-12-02 NOTE — PROVIDER CONTACT NOTE (OTHER) - BACKGROUND
notified KEITH Uribe on pt vitals and inquired about management, PA stated he is sub specialty but MD Andrade is following patient, and to relay concerns to medical team for now, will f/u

## 2019-12-02 NOTE — PROVIDER CONTACT NOTE (OTHER) - SITUATION
DX: pancreatitis
notified KEITH Dumont
notified MD Grabiel Matthew pt on levo 3.7mcg inquired if we can add on another pressor, suggested vasopressin, MD stated "no, advised MD as per policy another pressor should be added once pt is at 2mcg
notified MD Grabiel, pt HR elevated in 120s, pt on levophed 16mg on 3mcg, notified and inquired from MD if levo can be switched to phenylephrine, notified MD titrating up on levo to maintain systolic of
notified MD Miranda Meyers pt is going for Head CT can pt also go for CT of the chest for the pulmonary edema, MD stated as of now only Head CT is needed and pt is on 3% which is causing fluid shift
notified MD Nabila Mendez pt is not following commands but withdraws to pain  and pt is on propfol 30mcg bucking the vent constantly,  inquired to switch to precedex, MD  stated to keep pt on propofol
notified MD chest xray was not ordered  for patient, MD stated "that young girl there, no she is palliative I am not ordering chest xray, " notified MD pt is DNR all care must still be done until
notiifed KEITH Dumont, pt HR elevated in sustained 150s, and will restart propofol which was held for 40mins, pt unable to tolerate inquired for direct contact to MD Andrade
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
## 2019-12-02 NOTE — PROVIDER CONTACT NOTE (OTHER) - BACKGROUND
since pt  is at risk for herination , MD Grabiel Garlnad arrived at bedside, notified MD León pt is on propofol inquired for precedex, MD stated to keep propofol  no interventions at this time,

## 2019-12-02 NOTE — PROGRESS NOTE ADULT - ASSESSMENT
Impression:  Acute hypoxemic respiratory failure   Pulmonary edema   HFREF.    Left ICA dissection with left MCA thrombus sp thrombectomy and stenting.  Right ICA dissection   HO Bladder reconstruction with bladders stoma    PLAN:    CNS: Follow neurology, neurosurgery and IR.  Continue Sedation and paralysis for now.  DC propofol.  Use versed.      HEENT: Oral care    PULMONARY:  HOB @ 45 degrees. Vent changes.  .  RR 24.  I:E 1:1.  Wean FiO2.  Wean NO off     CARDIOVASCULAR: I=O. Maintain Sodium level 150-155.      GI: GI prophylaxis. Feeding and oral H2O,       RENAL:  Follow up lytes.  Correct as needed.  Might need RRT     INFECTIOUS DISEASE: Follow up cultures    HEMATOLOGICAL:  DVT prophylaxis.      ENDOCRINE:  Follow up FS.  Insulin protocol if needed.    MUSCULOSKELETAL: Bed rest    MICU for now.     Prognosis Poor    DW Mom at the bed side     DW IR and NeuroSX

## 2019-12-02 NOTE — DISCHARGE NOTE FOR THE EXPIRED PATIENT - HOSPITAL COURSE
34 year old female presenting with neurological deficits, found to have L MCA stroke with internal carotid dissection. Patient developed multiorgan dysfunction with elevated troponins, liver enzymes, and diminishing kidney function. Family wished to stop all life-sustaining care; patient was terminally extubated and pressors were stopped.
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
## 2019-12-02 NOTE — PROGRESS NOTE ADULT - SUBJECTIVE AND OBJECTIVE BOX
HD#3    S/P Left MCA Infarct    Pt seen and examined at bedside. Pt intubated, currently on Nimbex    Vital Signs Last 24 Hrs  T(C): 38.8 (02 Dec 2019 08:00), Max: 38.8 (02 Dec 2019 08:00)  T(F): 101.9 (02 Dec 2019 08:00), Max: 101.9 (02 Dec 2019 08:00)  HR: 132 (02 Dec 2019 10:00) (68 - 156)  BP: 73/40 (01 Dec 2019 20:00) (73/40 - 178/103)  BP(mean): 38 (01 Dec 2019 20:30) (38 - 120)  RR: 24 (02 Dec 2019 10:00) (21 - 67)  SpO2: 96% (02 Dec 2019 10:00) (77% - 100%)    I&O's Detail    01 Dec 2019 07:01  -  02 Dec 2019 07:00  --------------------------------------------------------  IN:    cisatracurium Infusion: 18.6 mL    norepinephrine Infusion: 2050.1 mL    norepinephrine Infusion: 220.5 mL    phenylephrine   Infusion: 459.7 mL    propofol Infusion: 15.3 mL    sodium bicarbonate  Infusion: 750 mL    sodium chloride 3%: 440 mL    sodium chloride 3%: 480 mL    sodium chloride 3%: 20 mL    vasopressin Infusion: 14.4 mL  Total IN: 4468.6 mL    OUT:    Indwelling Catheter - Stomal: 1155 mL  Total OUT: 1155 mL    Total NET: 3313.6 mL      02 Dec 2019 07:01  -  02 Dec 2019 11:27  --------------------------------------------------------  IN:    cisatracurium Infusion: 12.4 mL    midazolam Infusion: 3 mL    norepinephrine Infusion: 191.6 mL    phenylephrine   Infusion: 246.2 mL    propofol Infusion: 3 mL    sodium bicarbonate  Infusion: 600 mL    sodium chloride 3%: 40 mL    vasopressin Infusion: 9.6 mL  Total IN: 1105.8 mL    OUT:    Indwelling Catheter - Stomal: 10 mL  Total OUT: 10 mL    Total NET: 1095.8 mL    I&O's Summary    01 Dec 2019 07:  -  02 Dec 2019 07:00  --------------------------------------------------------  IN: 4468.6 mL / OUT: 1155 mL / NET: 3313.6 mL    02 Dec 2019 07:  -  02 Dec 2019 11:27  --------------------------------------------------------  IN: 1105.8 mL / OUT: 10 mL / NET: 1095.8 mL        PHYSICAL EXAM:  Neurological:  Pupils slightly unequal but reactive  No mvmt of UE to pain  No mvmt of LE to pain  No response to verbal    LABS:                        13.2   11.13 )-----------( 75       ( 02 Dec 2019 05:20 )             40.7     12-02    165<HH>  |  130<H>  |  18  ----------------------------<  202<H>  3.5   |  21  |  1.3    Ca    7.7<L>      02 Dec 2019 05:20  Phos  2.2     11-30  Mg     1.9     12    TPro  4.0<L>  /  Alb  2.5<L>  /  TBili  0.4  /  DBili  x   /  AST  330<H>  /  ALT  332<H>  /  AlkPhos  36  12-02    PT/INR - ( 01 Dec 2019 04:30 )   PT: 12.80 sec;   INR: 1.11 ratio    PTT - ( 01 Dec 2019 04:30 )  PTT:24.7 sec  Urinalysis Basic - ( 2019 17:50 )    Color: Light Yellow / Appearance: Clear / S.026 / pH: x  Gluc: x / Ketone: Negative  / Bili: Negative / Urobili: <2 mg/dL   Blood: x / Protein: Trace / Nitrite: Negative   Leuk Esterase: Negative / RBC: x / WBC x   Sq Epi: x / Non Sq Epi: x / Bacteria: x      CARDIAC MARKERS ( 02 Dec 2019 05:20 )  x     / 2.93 ng/mL / 742 U/L / x     / 61.2 ng/mL  CARDIAC MARKERS ( 2019 16:28 )  x     / <0.01 ng/mL / x     / x     / x        Allergies    latex (Rash; Urticaria)  No Known Drug Allergies    MEDICATIONS:  Antibiotics:    Neuro:  acetaminophen  Suppository .. 650 milliGRAM(s) Rectal every 6 hours PRN  cisatracurium Infusion 3 MICROgram(s)/kG/Min IV Continuous <Continuous>  HYDROmorphone  Injectable 1 milliGRAM(s) IV Push every 6 hours PRN  midazolam Infusion 0.02 mG/kG/Hr IV Continuous <Continuous>    IVF:  sodium bicarbonate  Infusion 0.147 mEq/kG/Hr IV Continuous <Continuous>    RADIOLOGY & ADDITIONAL TESTS:  < from: CT Head No Cont (19 @ 22:24) >  In comparison with the prior noncontrast CT scan of the brain dated   2019:    Again demonstrated is a large infarct involving the left frontal,   temporal and parietal lobes and left basal ganglia with associated mass   effect and shift, slight increase in shift of the interhemispheric   fissure to the right of midline.    The sulci in the right cerebral hemisphere are not well seen consistent   with cerebral edema, slight progression.      < end of copied text >      ASSESSMENT:  34y Female s/p    STROKE  ^STROKE  Handoff  MEWS Score  Bladder exstrophy  Anemia  Migraines  Stroke  Bladder extrophy  AMS  90+      PLAN:  EVD today

## 2019-12-02 NOTE — PROGRESS NOTE ADULT - SUBJECTIVE AND OBJECTIVE BOX
Patient is a 34y old  Female who presents with a chief complaint of Aphasia, AMS (02 Dec 2019 06:08)        Over Night Events:  Developed respiratory distress yesterday.  Was intubated.  Now on MV.  On 3 pressors.  Sedated and paralyzed.  On NO.          ROS:     CONSTITUTIONAL:   fever   no chills.  no weight gain   no weight loss    EYES:   no discharge,   no pain  no redness,   no visual changes.    ENT:   Ears: no ear pain and no hearing problems.  Nose: no nasal congestion and no nasal drainage.  Mouth/Throat: no dysphagia,  no hoarseness and no throat pain.  Neck: no lumps, no pain, no stiffness and no swollen glands.     CARDIOVASCULAR:   no chest pain,   no swelling  no palpitaions  no syncope    RESPIRATORY:  Per HPI    GASTROINTESTINAL:   no abdominal pain,   no constipation,   no diarrhea,   no vomiting.    GENITOURINARY:  no dysuria,   no frequency,   no urgency  no hematuria.    MUSCULOSKELETAL:   no back pain,   no musculoskeletal pain,  no weakness.    SKIN:   no jaundice,   no lesions,   no pruritis,   no rashes.    NEURO:   Per HPi    PSYCHIATRIC:   no known mental health issues  no anxiety  no depression    ALLERGIC/IMMUNOLOGIC:   No active allergic or immunologic issues        PHYSICAL EXAM    ICU Vital Signs Last 24 Hrs  T(C): 38.8 (02 Dec 2019 08:00), Max: 38.8 (02 Dec 2019 08:00)  T(F): 101.9 (02 Dec 2019 08:00), Max: 101.9 (02 Dec 2019 08:00)  HR: 120 (02 Dec 2019 08:00) (68 - 156)  BP: 128/95 (01 Dec 2019 18:30) (120/83 - 178/103)  BP(mean): 38 (01 Dec 2019 20:30) (38 - 120)  ABP: 110/80 (02 Dec 2019 08:00) (54/44 - 148/100)  ABP(mean): 90 (02 Dec 2019 08:00) (50 - 120)  RR: 30 (02 Dec 2019 08:00) (21 - 67)  SpO2: 100% (02 Dec 2019 08:00) (77% - 100%)      CONSTITUTIONAL:   Well nourished.  NAD.  Sedated and paralyzed     ENT:   Airway patent,   Mouth with normal mucosa.   No thrush    EYES:   Pupils equal,   Round and reactive to light.    CARDIAC:   Normal rate,   Regular rhythm.    No edema      Vascular:  Normal systolic impulse  No Carotid bruits    RESPIRATORY:   No wheezing  Bilateral BS  Normal chest expansion  Not tachypneic,  No use of accessory muscles    GASTROINTESTINAL:  Abdomen soft,   Non-tender,   No guarding,   + BS    GENITOURINARY  normal genitalia for sex  no edema    MUSCULOSKELETAL:   Range of motion is not limited,  No muscle or joint tenderness  No clubbing, cyanosis    NEUROLOGICAL:   Sedated and paralyzed     SKIN:   Skin normal color for race,   Warm and dry and intact.   No evidence of rash.    PSYCHIATRIC:   Sedated and paralyzed     HEME LYMPH:   No cervical  lymphadenopathy.  no inguinal lymphadenopathy      19 @ 07:01  -  19 @ 07:00  --------------------------------------------------------  IN:    norepinephrine Infusion: 220.5 mL    norepinephrine Infusion: 796.1 mL    propofol Infusion: 15.3 mL    sodium bicarbonate  Infusion: 600 mL    sodium chloride 3%: 440 mL    sodium chloride 3%: 480 mL    sodium chloride 3%.: 20 mL  Total IN: 2571.9 mL    OUT:    Indwelling Catheter - Stomal: 1105 mL  Total OUT: 1105 mL    Total NET: 1466.9 mL      19 @ 07:01  -  19 @ 08:46  --------------------------------------------------------  IN:    cisatracurium Infusion: 6.2 mL    norepinephrine Infusion: 95.8 mL    phenylephrine   Infusion: 172.4 mL    propofol Infusion: 3 mL    sodium bicarbonate  Infusion: 300 mL    sodium chloride 3%.: 40 mL    vasopressin Infusion: 4.8 mL  Total IN: 622.2 mL    OUT:    Indwelling Catheter - Stomal: 5 mL  Total OUT: 5 mL    Total NET: 617.2 mL          LABS:                            13.2   11.13 )-----------( 75       ( 02 Dec 2019 05:20 )             40.7                                                   165<HH>  |  130<H>  |  18  ----------------------------<  202<H>  3.5   |  21  |  1.3      Ca    7.7<L>      02 Dec 2019 05:20  Phos  2.2     11-30  Mg     1.9     12-02    TPro  4.0<L>  /  Alb  2.5<L>  /  TBili  0.4  /  DBili  x   /  AST  330<H>  /  ALT  332<H>  /  AlkPhos  36  12-02      PT/INR - ( 01 Dec 2019 04:30 )   PT: 12.80 sec;   INR: 1.11 ratio         PTT - ( 01 Dec 2019 04:30 )  PTT:24.7 sec                                       Urinalysis Basic - ( 2019 17:50 )    Color: Light Yellow / Appearance: Clear / S.026 / pH: x  Gluc: x / Ketone: Negative  / Bili: Negative / Urobili: <2 mg/dL   Blood: x / Protein: Trace / Nitrite: Negative   Leuk Esterase: Negative / RBC: x / WBC x   Sq Epi: x / Non Sq Epi: x / Bacteria: x        CARDIAC MARKERS ( 02 Dec 2019 05:20 )  x     / 2.93 ng/mL / 742 U/L / x     / 61.2 ng/mL  CARDIAC MARKERS ( 2019 16:28 )  x     / <0.01 ng/mL / x     / x     / x                                                LIVER FUNCTIONS - ( 02 Dec 2019 05:20 )  Alb: 2.5 g/dL / Pro: 4.0 g/dL / ALK PHOS: 36 U/L / ALT: 332 U/L / AST: 330 U/L / GGT: x                                                                                               Mode: AC/ CMV (Assist Control/ Continuous Mandatory Ventilation)  RR (machine): 30  TV (machine): 325  FiO2: 100  PEEP: 11  ITime: 1  MAP: 18  PIP: 31                                      ABG - ( 02 Dec 2019 05:59 )  pH, Arterial: 7.34  pH, Blood: x     /  pCO2: 42    /  pO2: 195   / HCO3: 23    / Base Excess: -3.1  /  SaO2: 100   Lac 4.5              MEDICATIONS  (STANDING):  aspirin  chewable 81 milliGRAM(s) Oral daily    chlorhexidine 4% Liquid 1 Application(s) Topical <User Schedule>  cisatracurium Infusion 3 MICROgram(s)/kG/Min (9.198 mL/Hr) IV Continuous <Continuous>  heparin  Injectable 5000 Unit(s) SubCutaneous every 12 hours  norepinephrine Infusion 0.05 MICROgram(s)/kG/Min (2.395 mL/Hr) IV Continuous <Continuous>  phenylephrine    Infusion 0.1 MICROgram(s)/kG/Min (0.958 mL/Hr) IV Continuous <Continuous>  propofol Infusion 5 MICROgram(s)/kG/Min (1.533 mL/Hr) IV Continuous <Continuous>  sodium bicarbonate  Infusion 0.147 mEq/kG/Hr (150 mL/Hr) IV Continuous <Continuous>  sodium chloride 3%. 500 milliLiter(s) (20 mL/Hr) IV Continuous <Continuous>  vasopressin Infusion 0.02 Unit(s)/Min (1.2 mL/Hr) IV Continuous <Continuous>    MEDICATIONS  (PRN):  acetaminophen  Suppository .. 650 milliGRAM(s) Rectal every 6 hours PRN Temp greater or equal to 38C (100.4F)  HYDROmorphone  Injectable 1 milliGRAM(s) IV Push every 6 hours PRN Moderate Pain (4 - 6)      New X-rays reviewed:                                                                                  ECHO    CXR interpreted by me:  ET OK.  Bilateral infiltrates and edema

## 2019-12-02 NOTE — PROVIDER CONTACT NOTE (MEDICATION) - SITUATION
MD Fran Potts contacted regarding pt ABG results from 6pm, pt is acidotic, inquired for bicarb drip, no interventions intitiated

## 2019-12-02 NOTE — PROGRESS NOTE ADULT - ATTENDING COMMENTS
Pt seen and examined with PA. Agree with above. Spoke to family. They are discussing whether they would want to proceed with decompressive craniectomy should she require it, which is highly likely.
Patient seen and examined and agree with above except as noted.  Reviewed imaging, labs, notes and vitals personally.  Patient currently intubated and sedated with recent paralytics used.  Pupils 3mm minimally reactive but symmetric.  Not moving extremities.    Plan as above (stabilize respiratory status then neurosurgery follow up)  Discussed with Mother at bedside

## 2019-12-02 NOTE — PROGRESS NOTE ADULT - ASSESSMENT
35 yo female with pmhx of anemia, bladder exstrophy, Migraines p.w  Aphasia, Right ext spasticity, Forced left sided gaze was found to have  Left ICA dissection with resultant Left MCA stroke s/p DSA/stenting/thrombectomy and failed recannalization. Worsening neurostatus overnight complicated with pulmonary edema.     Plan   Continue neuro checks q 1hrs  REEG  Keep HOB at 30 - reverse Trendelenburg  Keep -170  Follow up pending official read of repeat cth  Continue medical management  Neurosurgery follow up

## 2019-12-02 NOTE — CONSULT NOTE ADULT - ASSESSMENT
33 y/o F with JOSE, hypernatremia, hypokalemia, acidosis in hospital for Aphasia, Right side spasticity, AMS, Left side gaze, Left carotid artery dissection, lect MCA thrombus, Left Frontal/temporal/parietal infarct. On pressors, on sedation and NM blockers, intubated on vent  PMH Anemia, Bladder extrophy, Migraines.    # JOSE / hypokalemia / acidosis / hypernatremia   - JOSE likely ATN w/w hypoperfusion (SBP dropped to 60s) vs Propofol related   - oligo-anuric   - mild hypokalemia noted. monitor K levels, avoid over correction as pts with JOSE tend to frequently develop hyperK   - hypernatremia noted, s/p 3% saline yesterday to increase Na level. target Na level ~ 155,  replenish free water   - on bicarb drip. ? acidosis improved. repeat ABG   - on 3 pressors, keep MAP > 65   - Strict I/O   - repeat UA, urine urea, creatinine   - Hb at goal   - Corrected Ca, Mg at goal, repeat Ph   - trops trending up, consider cardio eval   - deranged LFTs noted, likely shock liver   - repeat PT/PTT/INR   - plan for single of lasix IV, ok to give    No indication for urgent RRT/CVVHD at the moment, but may need CVVHD very soon.  avoid nephrotoxins and hypotension.  will follow

## 2019-12-02 NOTE — PROVIDER CONTACT NOTE (OTHER) - ACTION/TREATMENT ORDERED:
MD Andrade arrived at 1am, intentevist arrived at 1am at bedside to treat and manage patient appropriately

## 2019-12-02 NOTE — PROGRESS NOTE ADULT - SUBJECTIVE AND OBJECTIVE BOX
Neurology Follow up note  Patient seen and examined at bedside , patient is mechanically vented and sedated on propofol and is currently on nimbex. Early in the evening hrs patient was able to follow commands but at around 10.30pm there was worsening of neurostatus as patient was not responsive to  commands/withdrawing to pain. Stat hct was obtained which was not significantly changed from prior and was reviewed by neurosurgery attending Dr Gustafson.  Patient's neurostatus did not improve and further worsened and  was complicated with Pulmonary edema and tachycardia with HR in the 150s. Neurosurgical team plans to insert an EVD early this am.       Vital Signs Last 24 Hrs  T(C): 36.7 (01 Dec 2019 16:00), Max: 36.7 (01 Dec 2019 12:00)  T(F): 98 (01 Dec 2019 16:00), Max: 98 (01 Dec 2019 12:00)  HR: 130 (02 Dec 2019 05:00) (46 - 156)  BP: 128/95 (01 Dec 2019 18:30) (114/52 - 178/103)  BP(mean): 38 (01 Dec 2019 20:30) (38 - 120)  RR: 30 (02 Dec 2019 05:00) (21 - 67)  SpO2: 100% (02 Dec 2019 05:00) (77% - 100%)          Neurological Exam: Exam limited as pt is on paralytic agent  Mental status: intubated sedated and on paralytic agent  Cranial nerves: left pupils 3mm brisk , right pupil 4mm brisk, left gaze deviation, weak gag   Motor: No spontaneous extremity mvmt  Sensation: No response to pain  Plantar response mute bilaterally        Medications  acetaminophen  Suppository .. 650 milliGRAM(s) Rectal every 6 hours PRN  aspirin  chewable 81 milliGRAM(s) Oral daily  calcium chloride Injectable 10 milliGRAM(s) IV Push once  chlorhexidine 4% Liquid 1 Application(s) Topical <User Schedule>  cisatracurium Infusion 3 MICROgram(s)/kG/Min IV Continuous <Continuous>  heparin  Injectable 5000 Unit(s) SubCutaneous every 12 hours  HYDROmorphone  Injectable 1 milliGRAM(s) IV Push every 6 hours PRN  norepinephrine Infusion 0.05 MICROgram(s)/kG/Min IV Continuous <Continuous>  phenylephrine    Infusion 0.1 MICROgram(s)/kG/Min IV Continuous <Continuous>  propofol Infusion 5 MICROgram(s)/kG/Min IV Continuous <Continuous>  sodium bicarbonate  Infusion 0.147 mEq/kG/Hr IV Continuous <Continuous>  sodium chloride 3%. 500 milliLiter(s) IV Continuous <Continuous>  sodium chloride 3%. 150 milliLiter(s) IV Continuous <Continuous>  vasopressin Infusion 0.02 Unit(s)/Min IV Continuous <Continuous>      Lab  Comprehensive Metabolic Panel (12.02.19 @ 05:20)    Sodium, Serum: 165: Critical value:  TYPE:(C=Critical, N=Notification, A=Abnormal) C  TESTS: _NA,TROP  DATE/TIME CALLED:12/02/19 06:30 _  CALLED TO: TY  READ BACK (2 Patient Identifiers)(Y/N):Y _  READ BACK VALUES (Y/N): _Y  CALLED BY: _SC mmol/L    Potassium, Serum: 3.5 mmol/L    Chloride, Serum: 130 mmol/L    Carbon Dioxide, Serum: 21 mmol/L    Anion Gap, Serum: 14: TYPE:(C=Critical, N=Notification, A=Abnormal) C  TESTS: _NA,TROP  DATE/TIME CALLED:12/02/19 06:30 _  CALLED TO: TY  READ BACK (2 Patient Identifiers)(Y/N):Y _  READ BACK VALUES (Y/N): _Y  CALLED BY: _SC mmol/L    Blood Urea Nitrogen, Serum: 18 mg/dL    Creatinine, Serum: 1.3 mg/dL    Glucose, Serum: 202 mg/dL    Calcium, Total Serum: 7.7 mg/dL    Protein Total, Serum: 4.0 g/dL    Albumin, Serum: 2.5 g/dL    Bilirubin Total, Serum: 0.4 mg/dL    Alkaline Phosphatase, Serum: 36 U/L    Aspartate Aminotransferase (AST/SGOT): 330 U/L    Alanine Aminotransferase (ALT/SGPT): 332 U/L    eGFR if Non : 53: Interpretative comment    Creatine Kinase, Serum (12.02.19 @ 05:20)    Creatine Kinase, Serum: 742 U/L

## 2019-12-02 NOTE — CHART NOTE - NSCHARTNOTEFT_GEN_A_CORE
Pt got repeat Head CT at ~10:30PM, reached pt bedside at 11PM to obtain repeat neuro exam. Pt intubated, sedated on propofol. Asked RN to take pt off sedation to obtain exam. Stayed at bedside until 11:45PM, pt not following commands/withdrawing to pain, exam attempted multiple times. Dr. Gustafson notified, stated will be at bedside shortly, will follow up note. Pt got repeat Head CT at ~10:30PM, reached pt bedside at 11PM to obtain repeat neuro exam. Pt intubated, sedated on propofol. Asked RN to take pt off sedation to obtain exam. Stayed at bedside until 11:45PM, pt not following commands/withdrawing to pain, exam attempted multiple times. Dr. Gustafson notified at 12AM, stated will be at bedside shortly, will follow up note.

## 2019-12-02 NOTE — PROGRESS NOTE ADULT - REASON FOR ADMISSION
Aphasia, AMS
Aphasia, AMS; Acute CVA
Aphasia, AMS

## 2019-12-04 LAB — ANA TITR SER: NEGATIVE — SIGNIFICANT CHANGE UP

## 2019-12-09 DIAGNOSIS — I77.3 ARTERIAL FIBROMUSCULAR DYSPLASIA: ICD-10-CM
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
## 2019-12-09 DIAGNOSIS — Z91.040 LATEX ALLERGY STATUS: ICD-10-CM

## 2019-12-09 DIAGNOSIS — R47.01 APHASIA: ICD-10-CM

## 2019-12-09 DIAGNOSIS — I77.71 DISSECTION OF CAROTID ARTERY: ICD-10-CM

## 2019-12-09 DIAGNOSIS — R00.1 BRADYCARDIA, UNSPECIFIED: ICD-10-CM

## 2019-12-09 DIAGNOSIS — I95.9 HYPOTENSION, UNSPECIFIED: ICD-10-CM

## 2019-12-09 DIAGNOSIS — N17.0 ACUTE KIDNEY FAILURE WITH TUBULAR NECROSIS: ICD-10-CM

## 2019-12-09 DIAGNOSIS — E87.6 HYPOKALEMIA: ICD-10-CM

## 2019-12-09 DIAGNOSIS — R29.718 NIHSS SCORE 18: ICD-10-CM

## 2019-12-09 DIAGNOSIS — E87.2 ACIDOSIS: ICD-10-CM

## 2019-12-09 DIAGNOSIS — J81.1 CHRONIC PULMONARY EDEMA: ICD-10-CM

## 2019-12-09 DIAGNOSIS — E87.0 HYPEROSMOLALITY AND HYPERNATREMIA: ICD-10-CM

## 2019-12-09 DIAGNOSIS — G81.11 SPASTIC HEMIPLEGIA AFFECTING RIGHT DOMINANT SIDE: ICD-10-CM

## 2019-12-09 DIAGNOSIS — Z66 DO NOT RESUSCITATE: ICD-10-CM

## 2019-12-09 DIAGNOSIS — I50.20 UNSPECIFIED SYSTOLIC (CONGESTIVE) HEART FAILURE: ICD-10-CM
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
## 2019-12-09 DIAGNOSIS — Q64.10 EXSTROPHY OF URINARY BLADDER, UNSPECIFIED: ICD-10-CM

## 2019-12-09 DIAGNOSIS — D64.9 ANEMIA, UNSPECIFIED: ICD-10-CM

## 2019-12-09 DIAGNOSIS — G93.6 CEREBRAL EDEMA: ICD-10-CM

## 2019-12-09 DIAGNOSIS — I63.512 CEREBRAL INFARCTION DUE TO UNSPECIFIED OCCLUSION OR STENOSIS OF LEFT MIDDLE CEREBRAL ARTERY: ICD-10-CM

## 2019-12-09 DIAGNOSIS — R00.0 TACHYCARDIA, UNSPECIFIED: ICD-10-CM

## 2019-12-09 DIAGNOSIS — J96.01 ACUTE RESPIRATORY FAILURE WITH HYPOXIA: ICD-10-CM
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
## 2022-03-04 NOTE — PATIENT PROFILE ADULT - NSPROHMGUSYMPCOND_GEN_A_NUR
-- DO NOT REPLY / DO NOT REPLY ALL --  -- Message is from the [facility]--    General Patient Message      Reason for Call: Patient needs a call back please. lvm     Caller Information       Type Contact Phone    03/04/2022 02:08 PM CST Phone (Incoming) Priscilla Paul (Self) [phone] (H)          Alternative phone number: none    Turnaround time given to caller:   \"This message will be sent to [state Provider's name]. The clinical team will fulfill your request as soon as they review your message.\"     frequency

## 2022-04-11 NOTE — ASU PREOP CHECKLIST - PATIENT SENT TO
[Dear  ___] : Dear  [unfilled], [Consult Letter:] : I had the pleasure of evaluating your patient, [unfilled]. [Please see my note below.] : Please see my note below. [Consult Closing:] : Thank you very much for allowing me to participate in the care of this patient.  If you have any questions, please do not hesitate to contact me. [Sincerely,] : Sincerely, [FreeTextEntry3] : Dr Romano\par  endoscopy

## 2022-07-19 PROBLEM — N81.2 CERVICAL PROLAPSE: Status: ACTIVE | Noted: 2019-01-01

## 2023-07-10 NOTE — HISTORY OF PRESENT ILLNESS
[de-identified] : Karla is a mel 30 yo lady with history of long standing iron deficiency anemia. She reports no h/o heavy menses and describes her periods as normal and regular. \par Her past medical history is remarkable for h/o bladder extrophy with neobladder creation at age 10. Currently she reports h/i bladder (and ? kidney) stones, she has uterine prolapse. \par She has not had a colonoscopy and is afraid to get the procedure 2/2 multiple intraabdominal surgeries in the past. \par She has had h/o iron infusions in the past.\par Her heritage is [place] and [place], there may be a component of thalassemia trait as well.\par She is only symptomatic with fatigue, denies CP, palpitations, dizziness, SOB.\par Oral iron has been ineffective in the past.  [de-identified] : 10/18/2017\par  Ms. Acosta is here today for a follow up visit. She has received two doses of feraheme and repeat cbc shows marked improvement in Hb and iron levels. \par Also noted is persistent leukopenia, mild. She is on gluten free diet along with her mother and she feels it has helped her with migraines. She had endoscopy three years ago and it was not a pleasant experience and she is reluctant to get scoped again. Her menses are regular and normal. Not heavy. She offers no other complaints at this time.\par \par 1/18/2018: Karla presents again for follow up, her iron level is low again, this time she wishes to receive Venofer. She reports recent upper respiratory tract infection that lasted 4-6 weeks, she is concerned about this. We have discussed that her flow cytometry was negative, but gold standard test to diagnosed the cause of mild leukopenia would be bone marrow biopsy, she would like to get this done. She also was advised to see GI, will arrange for visit with Dr. Walker. \par \par 5/18/18: Pt is here for her f/u visit. No fresh complaints. She had EGD and colonoscopy done by Dr Walker. No malignancy or source of bleeding seen. She will undergo capsule endoscopy but Dr Walker wants to give her a dummy pill before doing the actual capsule endoscopy. She has h/o bladder extrophy and was seeing a urogynecologist in the past. Her Hb today is 13.8. \par \par 3/14/18: Pt returns for a follow-up visit. She has no fresh complaints today. She finished her 5 doses of venofer. Her Hb today is 13. Her ferritin improved to 220 and percent sat 33%. She also had a BM biopsy done for leukopenia. It was negative for MDS with normal karyotype and flow cytometry.  She is scheduled to undergo EGD and colonoscopy by Dr Chiu. \par \par 1/23/2019: Patient had EGD/colonoscopy in the past which were normal. SHe had capsule endoscopy in August but she did not get any results. SHe denies and rectal bleed of melena , but reported vaginal bleed for 2 months. SHe describes a protrusion from her vagina , that becomes more prominent in a standing position. SHe did not seek medical attention , but is asking for a referral . She will see Dr Walker on Feb 6.\par \par 3/7/2019 : Patient is feeling much better after IV iron . She was seen by uro/gyn Dr Shi who offered  a vaginal posterior colporrhaphy/sacrospinous ligament fixation and cysto through the stoma by urology (with around 70% success rate ) versus abdominal reconstruction with ARIANE/sacrocolpopexy with synthetic mesh (around 85 to 90% success rate with much higher risks). Patient will go for a second opinion at [facility]. She denies any vaginal bleeding for now . She was offered pessary by her GYN Dr Vázquez. If bleeding reoccurs, she will start OCP \par \par 7/23/2019: Mary is here for follow up. She reports ongoing vaginal bleeding and now spotting since she was started on OCP by Dr. Dyson. She will require 5 additional dose of Venofer, will start today. She has had multiple follow ups at [facility] including Urology, Nephrology, UroGyn, she is planned for surgery with UroGyn at the end of 10/2019 for cervical prolapse. Overall she is doing well, denies GIB.  Statement Selected

## 2024-06-21 NOTE — PRE-ANESTHESIA EVALUATION ADULT - WEIGHT IN LBS
Received bedside report from night RN and assumed patient care. Pt found to be resting in bed with no signs of distress, 3L NC, A/Ox4, and on tele monitor. Call bell and personal possessions within reach, bed locked/lowest position. Pt denies additional needs at this time.    121.2